# Patient Record
Sex: FEMALE | Race: WHITE | Employment: FULL TIME | URBAN - METROPOLITAN AREA
[De-identification: names, ages, dates, MRNs, and addresses within clinical notes are randomized per-mention and may not be internally consistent; named-entity substitution may affect disease eponyms.]

---

## 2017-01-04 DIAGNOSIS — R51.9 GENERALIZED HEADACHES: ICD-10-CM

## 2017-01-05 RX ORDER — TIZANIDINE HYDROCHLORIDE 2 MG/1
CAPSULE, GELATIN COATED ORAL
Qty: 30 CAPSULE | Refills: 0 | Status: SHIPPED | OUTPATIENT
Start: 2017-01-05 | End: 2017-12-15

## 2017-01-05 RX ORDER — IBUPROFEN 800 MG/1
TABLET ORAL
Qty: 60 TABLET | Refills: 0 | Status: SHIPPED | OUTPATIENT
Start: 2017-01-05 | End: 2017-12-15

## 2017-05-27 ENCOUNTER — HOSPITAL ENCOUNTER (EMERGENCY)
Age: 22
Discharge: HOME | End: 2017-05-27
Attending: EMERGENCY MEDICINE
Payer: COMMERCIAL

## 2017-05-27 VITALS
BODY MASS INDEX: 21.66 KG/M2 | OXYGEN SATURATION: 98 % | SYSTOLIC BLOOD PRESSURE: 110 MMHG | RESPIRATION RATE: 16 BRPM | DIASTOLIC BLOOD PRESSURE: 70 MMHG | TEMPERATURE: 97.7 F | HEART RATE: 86 BPM | HEIGHT: 65 IN | WEIGHT: 130 LBS

## 2017-05-27 DIAGNOSIS — B27.80 OTHER INFECTIOUS MONONUCLEOSIS WITHOUT COMPLICATION: Primary | ICD-10-CM

## 2017-05-27 LAB
-: NORMAL
CHP ED QC CHECK: YES
DIRECT EXAM: NORMAL
HCG, PREGNANCY URINE (POC): NEGATIVE
Lab: NORMAL
MONONUCLEOSIS SCREEN: POSITIVE
PREGNANCY TEST URINE, POC: NEGATIVE
SPECIMEN DESCRIPTION: NORMAL
SPECIMEN DESCRIPTION: NORMAL
STATUS: NORMAL

## 2017-05-27 PROCEDURE — 96372 THER/PROPH/DIAG INJ SC/IM: CPT

## 2017-05-27 PROCEDURE — 36415 COLL VENOUS BLD VENIPUNCTURE: CPT

## 2017-05-27 PROCEDURE — 84703 CHORIONIC GONADOTROPIN ASSAY: CPT

## 2017-05-27 PROCEDURE — 99283 EMERGENCY DEPT VISIT LOW MDM: CPT

## 2017-05-27 PROCEDURE — 86308 HETEROPHILE ANTIBODY SCREEN: CPT

## 2017-05-27 PROCEDURE — 6360000002 HC RX W HCPCS: Performed by: PHYSICIAN ASSISTANT

## 2017-05-27 PROCEDURE — 6370000000 HC RX 637 (ALT 250 FOR IP): Performed by: PHYSICIAN ASSISTANT

## 2017-05-27 PROCEDURE — 87880 STREP A ASSAY W/OPTIC: CPT

## 2017-05-27 RX ORDER — AMOXICILLIN 500 MG/1
500 CAPSULE ORAL 2 TIMES DAILY
COMMUNITY
End: 2017-12-15

## 2017-05-27 RX ORDER — DIPHENHYDRAMINE HCL 25 MG
25 TABLET ORAL EVERY 6 HOURS PRN
Status: DISCONTINUED | OUTPATIENT
Start: 2017-05-27 | End: 2017-05-27 | Stop reason: HOSPADM

## 2017-05-27 RX ORDER — DEXAMETHASONE SODIUM PHOSPHATE 4 MG/ML
10 INJECTION, SOLUTION INTRA-ARTICULAR; INTRALESIONAL; INTRAMUSCULAR; INTRAVENOUS; SOFT TISSUE ONCE
Status: COMPLETED | OUTPATIENT
Start: 2017-05-27 | End: 2017-05-27

## 2017-05-27 RX ADMIN — DIPHENHYDRAMINE HCL 25 MG: 25 TABLET ORAL at 20:56

## 2017-05-27 RX ADMIN — DEXAMETHASONE SODIUM PHOSPHATE 10 MG: 4 INJECTION, SOLUTION INTRAMUSCULAR; INTRAVENOUS at 20:56

## 2017-05-27 ASSESSMENT — ENCOUNTER SYMPTOMS
VOMITING: 0
PERI-ORBITAL EDEMA: 0
ABDOMINAL PAIN: 0
COUGH: 1
WHEEZING: 0
HOARSE VOICE: 0
THROAT SWELLING: 0
SORE THROAT: 1
SHORTNESS OF BREATH: 0
DIARRHEA: 0

## 2017-05-27 ASSESSMENT — PAIN DESCRIPTION - DESCRIPTORS: DESCRIPTORS: ACHING

## 2017-05-27 ASSESSMENT — PAIN DESCRIPTION - LOCATION: LOCATION: CHEST;HEAD

## 2017-05-27 ASSESSMENT — PAIN DESCRIPTION - FREQUENCY: FREQUENCY: CONTINUOUS

## 2017-05-27 ASSESSMENT — PAIN SCALES - GENERAL: PAINLEVEL_OUTOF10: 6

## 2018-10-12 ENCOUNTER — HOSPITAL ENCOUNTER (OUTPATIENT)
Dept: GENERAL RADIOLOGY | Facility: CLINIC | Age: 23
Discharge: HOME | End: 2018-10-14
Payer: COMMERCIAL

## 2018-10-12 ENCOUNTER — HOSPITAL ENCOUNTER (OUTPATIENT)
Facility: CLINIC | Age: 23
Discharge: HOME | End: 2018-10-14
Payer: COMMERCIAL

## 2018-10-12 DIAGNOSIS — M25.531 RIGHT WRIST PAIN: ICD-10-CM

## 2018-10-12 PROCEDURE — 73110 X-RAY EXAM OF WRIST: CPT

## 2019-06-21 ENCOUNTER — HOSPITAL ENCOUNTER (OUTPATIENT)
Age: 24
Discharge: HOME | End: 2019-06-21
Payer: COMMERCIAL

## 2019-06-21 LAB
ALBUMIN SERPL-MCNC: 4.5 G/DL (ref 3.5–5.2)
ALBUMIN/GLOBULIN RATIO: ABNORMAL (ref 1–2.5)
ALP BLD-CCNC: 57 U/L (ref 35–104)
ALT SERPL-CCNC: 30 U/L (ref 5–33)
ANION GAP SERPL CALCULATED.3IONS-SCNC: 10 MMOL/L (ref 9–17)
AST SERPL-CCNC: 25 U/L
BILIRUB SERPL-MCNC: 0.37 MG/DL (ref 0.3–1.2)
BUN BLDV-MCNC: 27 MG/DL (ref 6–20)
BUN/CREAT BLD: ABNORMAL (ref 9–20)
CALCIUM SERPL-MCNC: 9.6 MG/DL (ref 8.6–10.4)
CHLORIDE BLD-SCNC: 101 MMOL/L (ref 98–107)
CHOLESTEROL/HDL RATIO: 2.2
CHOLESTEROL: 183 MG/DL
CO2: 26 MMOL/L (ref 20–31)
CREAT SERPL-MCNC: 0.86 MG/DL (ref 0.5–0.9)
ESTRADIOL LEVEL: 63 PG/ML (ref 27–314)
FOLLICLE STIMULATING HORMONE: 7 U/L (ref 1.7–21.5)
GFR AFRICAN AMERICAN: >60 ML/MIN
GFR NON-AFRICAN AMERICAN: >60 ML/MIN
GFR SERPL CREATININE-BSD FRML MDRD: ABNORMAL ML/MIN/{1.73_M2}
GFR SERPL CREATININE-BSD FRML MDRD: ABNORMAL ML/MIN/{1.73_M2}
GLUCOSE BLD-MCNC: 72 MG/DL (ref 70–99)
HCT VFR BLD CALC: 44.7 % (ref 36–46)
HDLC SERPL-MCNC: 82 MG/DL
HEMOGLOBIN: 14.5 G/DL (ref 12–16)
INSULIN COMMENT: NORMAL
INSULIN REFERENCE RANGE:: NORMAL
INSULIN: 15.8 MU/L
LDL CHOLESTEROL: 90 MG/DL (ref 0–130)
LH: 10.1 U/L (ref 1–95.6)
MCH RBC QN AUTO: 31.4 PG (ref 26–34)
MCHC RBC AUTO-ENTMCNC: 32.4 G/DL (ref 31–37)
MCV RBC AUTO: 96.9 FL (ref 80–100)
NRBC AUTOMATED: ABNORMAL PER 100 WBC
PDW BLD-RTO: 13.4 % (ref 11.5–14.9)
PLATELET # BLD: 190 K/UL (ref 150–450)
PMV BLD AUTO: 8 FL (ref 6–12)
POTASSIUM SERPL-SCNC: 4.3 MMOL/L (ref 3.7–5.3)
PROLACTIN: 2.96 UG/L (ref 4.79–23.3)
RBC # BLD: 4.62 M/UL (ref 4–5.2)
SODIUM BLD-SCNC: 137 MMOL/L (ref 135–144)
TESTOSTERONE TOTAL: 21 NG/DL (ref 20–70)
TOTAL PROTEIN: 7.5 G/DL (ref 6.4–8.3)
TRIGL SERPL-MCNC: 54 MG/DL
VLDLC SERPL CALC-MCNC: NORMAL MG/DL (ref 1–30)
WBC # BLD: 4 K/UL (ref 4.5–13.5)

## 2019-06-21 PROCEDURE — 82670 ASSAY OF TOTAL ESTRADIOL: CPT

## 2019-06-21 PROCEDURE — 83002 ASSAY OF GONADOTROPIN (LH): CPT

## 2019-06-21 PROCEDURE — 84146 ASSAY OF PROLACTIN: CPT

## 2019-06-21 PROCEDURE — 83001 ASSAY OF GONADOTROPIN (FSH): CPT

## 2019-06-21 PROCEDURE — 80053 COMPREHEN METABOLIC PANEL: CPT

## 2019-06-21 PROCEDURE — 84403 ASSAY OF TOTAL TESTOSTERONE: CPT

## 2019-06-21 PROCEDURE — 85027 COMPLETE CBC AUTOMATED: CPT

## 2019-06-21 PROCEDURE — 83525 ASSAY OF INSULIN: CPT

## 2019-06-21 PROCEDURE — 36415 COLL VENOUS BLD VENIPUNCTURE: CPT

## 2019-06-21 PROCEDURE — 80061 LIPID PANEL: CPT

## 2019-08-27 ENCOUNTER — HOSPITAL ENCOUNTER (OUTPATIENT)
Age: 24
Setting detail: SPECIMEN
Discharge: HOME | End: 2019-08-27
Payer: COMMERCIAL

## 2019-08-27 DIAGNOSIS — R14.0 ABDOMINAL BLOATING: ICD-10-CM

## 2019-08-27 DIAGNOSIS — R68.81 EARLY SATIETY: ICD-10-CM

## 2019-08-27 DIAGNOSIS — R19.7 DIARRHEA, UNSPECIFIED TYPE: ICD-10-CM

## 2019-08-27 DIAGNOSIS — R10.9 ABDOMINAL CRAMPING: ICD-10-CM

## 2019-08-27 LAB
ABSOLUTE EOS #: <0.03 K/UL (ref 0–0.44)
ABSOLUTE IMMATURE GRANULOCYTE: <0.03 K/UL (ref 0–0.3)
ABSOLUTE LYMPH #: 1.7 K/UL (ref 1.1–3.7)
ABSOLUTE MONO #: 0.57 K/UL (ref 0.1–1.4)
ALBUMIN SERPL-MCNC: 4.5 G/DL (ref 3.5–5.2)
ALBUMIN/GLOBULIN RATIO: 1.4 (ref 1–2.5)
ALP BLD-CCNC: 52 U/L (ref 35–104)
ALT SERPL-CCNC: 39 U/L (ref 5–33)
AMYLASE: 71 U/L (ref 28–100)
ANION GAP SERPL CALCULATED.3IONS-SCNC: 15 MMOL/L (ref 9–17)
AST SERPL-CCNC: 30 U/L
BASOPHILS # BLD: 1 % (ref 0–2)
BASOPHILS ABSOLUTE: 0.04 K/UL (ref 0–0.2)
BILIRUB SERPL-MCNC: 0.48 MG/DL (ref 0.3–1.2)
BUN BLDV-MCNC: 21 MG/DL (ref 6–20)
BUN/CREAT BLD: ABNORMAL (ref 9–20)
CALCIUM SERPL-MCNC: 10.1 MG/DL (ref 8.6–10.4)
CHLORIDE BLD-SCNC: 102 MMOL/L (ref 98–107)
CO2: 23 MMOL/L (ref 20–31)
CREAT SERPL-MCNC: 0.76 MG/DL (ref 0.5–0.9)
DIFFERENTIAL TYPE: ABNORMAL
EOSINOPHILS RELATIVE PERCENT: 0 % (ref 1–4)
GFR AFRICAN AMERICAN: >60 ML/MIN
GFR NON-AFRICAN AMERICAN: >60 ML/MIN
GFR SERPL CREATININE-BSD FRML MDRD: ABNORMAL ML/MIN/{1.73_M2}
GFR SERPL CREATININE-BSD FRML MDRD: ABNORMAL ML/MIN/{1.73_M2}
GLUCOSE BLD-MCNC: 79 MG/DL (ref 70–99)
HCT VFR BLD CALC: 45.7 % (ref 36.3–47.1)
HEMOGLOBIN: 14.5 G/DL (ref 11.9–15.1)
IMMATURE GRANULOCYTES: 0 %
LIPASE: 32 U/L (ref 13–60)
LYMPHOCYTES # BLD: 22 % (ref 25–45)
MCH RBC QN AUTO: 30.9 PG (ref 25.2–33.5)
MCHC RBC AUTO-ENTMCNC: 31.7 G/DL (ref 28.4–34.8)
MCV RBC AUTO: 97.2 FL (ref 82.6–102.9)
MONOCYTES # BLD: 7 % (ref 2–8)
NRBC AUTOMATED: 0 PER 100 WBC
PDW BLD-RTO: 12.8 % (ref 11.8–14.4)
PLATELET # BLD: 209 K/UL (ref 138–453)
PLATELET ESTIMATE: ABNORMAL
PMV BLD AUTO: 11.9 FL (ref 8.1–13.5)
POTASSIUM SERPL-SCNC: 4.1 MMOL/L (ref 3.7–5.3)
RBC # BLD: 4.7 M/UL (ref 3.95–5.11)
RBC # BLD: ABNORMAL 10*6/UL
SEG NEUTROPHILS: 70 % (ref 34–64)
SEGMENTED NEUTROPHILS ABSOLUTE COUNT: 5.45 K/UL (ref 1.5–8.1)
SODIUM BLD-SCNC: 140 MMOL/L (ref 135–144)
TOTAL PROTEIN: 7.7 G/DL (ref 6.4–8.3)
WBC # BLD: 7.8 K/UL (ref 4.5–13.5)
WBC # BLD: ABNORMAL 10*3/UL

## 2019-08-28 LAB
GLIADIN DEAMINIDATED PEPTIDE AB IGA: 1 U/ML
GLIADIN DEAMINIDATED PEPTIDE AB IGG: <0.4 U/ML
IGA: 181 MG/DL (ref 70–400)
TISSUE TRANSGLUTAMINASE ANTIBODY IGG: <0.6 U/ML
TISSUE TRANSGLUTAMINASE IGA: 0.3 U/ML

## 2019-10-08 ENCOUNTER — HOSPITAL ENCOUNTER (OUTPATIENT)
Age: 24
Setting detail: SPECIMEN
Discharge: HOME | End: 2019-10-08
Payer: COMMERCIAL

## 2019-10-08 LAB
ESTRADIOL LEVEL: 34 PG/ML (ref 27–314)
TESTOSTERONE TOTAL: 350 NG/DL (ref 20–70)

## 2019-10-08 PROCEDURE — 82670 ASSAY OF TOTAL ESTRADIOL: CPT

## 2019-10-08 PROCEDURE — 36415 COLL VENOUS BLD VENIPUNCTURE: CPT

## 2019-10-08 PROCEDURE — 84403 ASSAY OF TOTAL TESTOSTERONE: CPT

## 2019-10-10 ENCOUNTER — HOSPITAL ENCOUNTER (EMERGENCY)
Age: 24
Discharge: HOME | End: 2019-10-10
Attending: EMERGENCY MEDICINE
Payer: COMMERCIAL

## 2019-10-10 VITALS
HEIGHT: 65 IN | HEART RATE: 80 BPM | WEIGHT: 132 LBS | SYSTOLIC BLOOD PRESSURE: 114 MMHG | BODY MASS INDEX: 21.99 KG/M2 | RESPIRATION RATE: 18 BRPM | TEMPERATURE: 98.3 F | DIASTOLIC BLOOD PRESSURE: 73 MMHG | OXYGEN SATURATION: 97 %

## 2019-10-10 DIAGNOSIS — K62.5 RECTAL BLEEDING: Primary | ICD-10-CM

## 2019-10-10 LAB
BILIRUBIN URINE: NEGATIVE
COLOR: YELLOW
COMMENT UA: NORMAL
GLUCOSE URINE: NEGATIVE
HCG QUALITATIVE: NEGATIVE
HCT VFR BLD CALC: 47.7 % (ref 36–46)
HEMOGLOBIN: 15.8 G/DL (ref 12–16)
KETONES, URINE: NEGATIVE
LEUKOCYTE ESTERASE, URINE: NEGATIVE
NITRITE, URINE: NEGATIVE
PH UA: 7 (ref 5–8)
PROTEIN UA: NEGATIVE
SPECIFIC GRAVITY UA: 1.01 (ref 1–1.03)
TURBIDITY: CLEAR
URINE HGB: NEGATIVE
UROBILINOGEN, URINE: NORMAL

## 2019-10-10 PROCEDURE — 84703 CHORIONIC GONADOTROPIN ASSAY: CPT

## 2019-10-10 PROCEDURE — 99283 EMERGENCY DEPT VISIT LOW MDM: CPT

## 2019-10-10 PROCEDURE — 36415 COLL VENOUS BLD VENIPUNCTURE: CPT

## 2019-10-10 PROCEDURE — 85018 HEMOGLOBIN: CPT

## 2019-10-10 PROCEDURE — 85014 HEMATOCRIT: CPT

## 2019-10-10 PROCEDURE — 81003 URINALYSIS AUTO W/O SCOPE: CPT

## 2019-10-10 ASSESSMENT — PAIN SCALES - GENERAL: PAINLEVEL_OUTOF10: 6

## 2019-10-11 ENCOUNTER — TELEPHONE (OUTPATIENT)
Dept: GASTROENTEROLOGY | Age: 24
End: 2019-10-11

## 2019-10-11 ENCOUNTER — HOSPITAL ENCOUNTER (OUTPATIENT)
Age: 24
Setting detail: SPECIMEN
Discharge: HOME | End: 2019-10-11
Payer: COMMERCIAL

## 2019-10-11 DIAGNOSIS — R53.83 FATIGUE, UNSPECIFIED TYPE: ICD-10-CM

## 2019-10-11 LAB
TSH SERPL DL<=0.05 MIU/L-ACNC: 2.66 MIU/L (ref 0.3–5)
VITAMIN D 25-HYDROXY: 32.2 NG/ML (ref 30–100)

## 2019-10-18 ENCOUNTER — HOSPITAL ENCOUNTER (EMERGENCY)
Age: 24
Discharge: HOME | End: 2019-10-18
Attending: EMERGENCY MEDICINE
Payer: COMMERCIAL

## 2019-10-18 VITALS
DIASTOLIC BLOOD PRESSURE: 80 MMHG | SYSTOLIC BLOOD PRESSURE: 98 MMHG | RESPIRATION RATE: 14 BRPM | BODY MASS INDEX: 21.99 KG/M2 | HEIGHT: 65 IN | HEART RATE: 73 BPM | TEMPERATURE: 98.2 F | OXYGEN SATURATION: 98 % | WEIGHT: 132 LBS

## 2019-10-18 DIAGNOSIS — F31.9 BIPOLAR 1 DISORDER (HCC): Primary | ICD-10-CM

## 2019-10-18 PROCEDURE — 99284 EMERGENCY DEPT VISIT MOD MDM: CPT

## 2019-10-18 PROCEDURE — 6370000000 HC RX 637 (ALT 250 FOR IP): Performed by: EMERGENCY MEDICINE

## 2019-10-18 RX ORDER — LORAZEPAM 1 MG/1
1 TABLET ORAL ONCE
Status: COMPLETED | OUTPATIENT
Start: 2019-10-18 | End: 2019-10-18

## 2019-10-18 RX ADMIN — LORAZEPAM 1 MG: 1 TABLET ORAL at 20:18

## 2019-10-24 ENCOUNTER — OFFICE VISIT (OUTPATIENT)
Dept: GASTROENTEROLOGY | Age: 24
End: 2019-10-24
Payer: COMMERCIAL

## 2019-10-24 VITALS
HEART RATE: 103 BPM | BODY MASS INDEX: 22.5 KG/M2 | WEIGHT: 135.2 LBS | SYSTOLIC BLOOD PRESSURE: 121 MMHG | DIASTOLIC BLOOD PRESSURE: 76 MMHG

## 2019-10-24 DIAGNOSIS — K62.5 RECTAL BLEEDING: Primary | ICD-10-CM

## 2019-10-24 DIAGNOSIS — R10.9 ABDOMINAL CRAMPING: ICD-10-CM

## 2019-10-24 PROCEDURE — 99244 OFF/OP CNSLTJ NEW/EST MOD 40: CPT | Performed by: INTERNAL MEDICINE

## 2019-10-24 ASSESSMENT — ENCOUNTER SYMPTOMS
SORE THROAT: 0
NAUSEA: 1
ALLERGIC/IMMUNOLOGIC NEGATIVE: 1
CHOKING: 0
WHEEZING: 0
TROUBLE SWALLOWING: 0
ABDOMINAL PAIN: 1
VOICE CHANGE: 0
BLOOD IN STOOL: 1
RECTAL PAIN: 0
COUGH: 0
RESPIRATORY NEGATIVE: 1
CONSTIPATION: 1
SINUS PRESSURE: 0
BACK PAIN: 0
VOMITING: 0
DIARRHEA: 1
ABDOMINAL DISTENTION: 1
ANAL BLEEDING: 1
EYES NEGATIVE: 1

## 2019-10-25 RX ORDER — POLYETHYLENE GLYCOL 3350 17 G/17G
POWDER, FOR SOLUTION ORAL
Qty: 255 G | Refills: 0 | Status: SHIPPED | OUTPATIENT
Start: 2019-10-25 | End: 2019-11-14 | Stop reason: ALTCHOICE

## 2019-10-28 ENCOUNTER — TELEPHONE (OUTPATIENT)
Dept: GASTROENTEROLOGY | Age: 24
End: 2019-10-28

## 2019-11-04 ENCOUNTER — ANESTHESIA EVENT (OUTPATIENT)
Dept: ENDOSCOPY | Age: 24
End: 2019-11-04
Payer: COMMERCIAL

## 2019-11-04 ENCOUNTER — ANESTHESIA (OUTPATIENT)
Dept: ENDOSCOPY | Age: 24
End: 2019-11-04
Payer: COMMERCIAL

## 2019-11-04 ENCOUNTER — HOSPITAL ENCOUNTER (OUTPATIENT)
Age: 24
Setting detail: OUTPATIENT SURGERY
Discharge: HOME | End: 2019-11-04
Attending: INTERNAL MEDICINE | Admitting: INTERNAL MEDICINE
Payer: COMMERCIAL

## 2019-11-04 VITALS — SYSTOLIC BLOOD PRESSURE: 85 MMHG | DIASTOLIC BLOOD PRESSURE: 48 MMHG | OXYGEN SATURATION: 98 %

## 2019-11-04 VITALS
RESPIRATION RATE: 11 BRPM | OXYGEN SATURATION: 96 % | SYSTOLIC BLOOD PRESSURE: 91 MMHG | DIASTOLIC BLOOD PRESSURE: 66 MMHG | BODY MASS INDEX: 22.49 KG/M2 | TEMPERATURE: 97.5 F | HEART RATE: 69 BPM | HEIGHT: 65 IN | WEIGHT: 135 LBS

## 2019-11-04 LAB
-: NORMAL
HCG, PREGNANCY URINE (POC): NEGATIVE

## 2019-11-04 PROCEDURE — 45380 COLONOSCOPY AND BIOPSY: CPT | Performed by: INTERNAL MEDICINE

## 2019-11-04 PROCEDURE — 88305 TISSUE EXAM BY PATHOLOGIST: CPT

## 2019-11-04 PROCEDURE — 2580000003 HC RX 258: Performed by: INTERNAL MEDICINE

## 2019-11-04 PROCEDURE — 7100000001 HC PACU RECOVERY - ADDTL 15 MIN: Performed by: INTERNAL MEDICINE

## 2019-11-04 PROCEDURE — 81025 URINE PREGNANCY TEST: CPT

## 2019-11-04 PROCEDURE — 2500000003 HC RX 250 WO HCPCS: Performed by: NURSE ANESTHETIST, CERTIFIED REGISTERED

## 2019-11-04 PROCEDURE — 3609010300 HC COLONOSCOPY W/BIOPSY SINGLE/MULTIPLE: Performed by: INTERNAL MEDICINE

## 2019-11-04 PROCEDURE — 6360000002 HC RX W HCPCS: Performed by: NURSE ANESTHETIST, CERTIFIED REGISTERED

## 2019-11-04 PROCEDURE — 2709999900 HC NON-CHARGEABLE SUPPLY: Performed by: INTERNAL MEDICINE

## 2019-11-04 PROCEDURE — 3700000000 HC ANESTHESIA ATTENDED CARE: Performed by: INTERNAL MEDICINE

## 2019-11-04 PROCEDURE — 6360000002 HC RX W HCPCS: Performed by: ANESTHESIOLOGY

## 2019-11-04 PROCEDURE — 3700000001 HC ADD 15 MINUTES (ANESTHESIA): Performed by: INTERNAL MEDICINE

## 2019-11-04 PROCEDURE — 6370000000 HC RX 637 (ALT 250 FOR IP): Performed by: ANESTHESIOLOGY

## 2019-11-04 PROCEDURE — 7100000000 HC PACU RECOVERY - FIRST 15 MIN: Performed by: INTERNAL MEDICINE

## 2019-11-04 RX ORDER — DIPHENHYDRAMINE HYDROCHLORIDE 50 MG/ML
12.5 INJECTION INTRAMUSCULAR; INTRAVENOUS
Status: DISCONTINUED | OUTPATIENT
Start: 2019-11-04 | End: 2019-11-04 | Stop reason: HOSPADM

## 2019-11-04 RX ORDER — LABETALOL 20 MG/4 ML (5 MG/ML) INTRAVENOUS SYRINGE
5 EVERY 10 MIN PRN
Status: DISCONTINUED | OUTPATIENT
Start: 2019-11-04 | End: 2019-11-04 | Stop reason: HOSPADM

## 2019-11-04 RX ORDER — PROMETHAZINE HYDROCHLORIDE 25 MG/ML
6.25 INJECTION, SOLUTION INTRAMUSCULAR; INTRAVENOUS
Status: DISCONTINUED | OUTPATIENT
Start: 2019-11-04 | End: 2019-11-04 | Stop reason: SDUPTHER

## 2019-11-04 RX ORDER — PROPOFOL 10 MG/ML
INJECTION, EMULSION INTRAVENOUS PRN
Status: DISCONTINUED | OUTPATIENT
Start: 2019-11-04 | End: 2019-11-04 | Stop reason: SDUPTHER

## 2019-11-04 RX ORDER — HYDRALAZINE HYDROCHLORIDE 20 MG/ML
5 INJECTION INTRAMUSCULAR; INTRAVENOUS EVERY 10 MIN PRN
Status: DISCONTINUED | OUTPATIENT
Start: 2019-11-04 | End: 2019-11-04 | Stop reason: HOSPADM

## 2019-11-04 RX ORDER — SODIUM CHLORIDE, SODIUM LACTATE, POTASSIUM CHLORIDE, CALCIUM CHLORIDE 600; 310; 30; 20 MG/100ML; MG/100ML; MG/100ML; MG/100ML
INJECTION, SOLUTION INTRAVENOUS CONTINUOUS
Status: DISCONTINUED | OUTPATIENT
Start: 2019-11-04 | End: 2019-11-04 | Stop reason: HOSPADM

## 2019-11-04 RX ORDER — MIDAZOLAM HYDROCHLORIDE 1 MG/ML
INJECTION INTRAMUSCULAR; INTRAVENOUS PRN
Status: DISCONTINUED | OUTPATIENT
Start: 2019-11-04 | End: 2019-11-04 | Stop reason: SDUPTHER

## 2019-11-04 RX ORDER — LIDOCAINE HYDROCHLORIDE 10 MG/ML
INJECTION, SOLUTION EPIDURAL; INFILTRATION; INTRACAUDAL; PERINEURAL PRN
Status: DISCONTINUED | OUTPATIENT
Start: 2019-11-04 | End: 2019-11-04 | Stop reason: SDUPTHER

## 2019-11-04 RX ORDER — ONDANSETRON 2 MG/ML
4 INJECTION INTRAMUSCULAR; INTRAVENOUS ONCE
Status: COMPLETED | OUTPATIENT
Start: 2019-11-04 | End: 2019-11-04

## 2019-11-04 RX ORDER — HYDROXYZINE HYDROCHLORIDE 25 MG/1
25 TABLET, FILM COATED ORAL DAILY
COMMUNITY
End: 2020-10-12

## 2019-11-04 RX ORDER — ONDANSETRON 2 MG/ML
4 INJECTION INTRAMUSCULAR; INTRAVENOUS
Status: DISCONTINUED | OUTPATIENT
Start: 2019-11-04 | End: 2019-11-04 | Stop reason: HOSPADM

## 2019-11-04 RX ORDER — 0.9 % SODIUM CHLORIDE 0.9 %
500 INTRAVENOUS SOLUTION INTRAVENOUS
Status: DISCONTINUED | OUTPATIENT
Start: 2019-11-04 | End: 2019-11-04 | Stop reason: HOSPADM

## 2019-11-04 RX ORDER — SCOLOPAMINE TRANSDERMAL SYSTEM 1 MG/1
1 PATCH, EXTENDED RELEASE TRANSDERMAL
Status: DISCONTINUED | OUTPATIENT
Start: 2019-11-04 | End: 2019-11-04 | Stop reason: HOSPADM

## 2019-11-04 RX ADMIN — SODIUM CHLORIDE, POTASSIUM CHLORIDE, SODIUM LACTATE AND CALCIUM CHLORIDE: 600; 310; 30; 20 INJECTION, SOLUTION INTRAVENOUS at 06:56

## 2019-11-04 RX ADMIN — MIDAZOLAM 2 MG: 1 INJECTION INTRAMUSCULAR; INTRAVENOUS at 07:41

## 2019-11-04 RX ADMIN — ONDANSETRON 4 MG: 2 INJECTION INTRAMUSCULAR; INTRAVENOUS at 07:13

## 2019-11-04 RX ADMIN — PROPOFOL 100 MG: 10 INJECTION, EMULSION INTRAVENOUS at 07:52

## 2019-11-04 RX ADMIN — PROPOFOL 100 MG: 10 INJECTION, EMULSION INTRAVENOUS at 08:01

## 2019-11-04 RX ADMIN — PROPOFOL 100 MG: 10 INJECTION, EMULSION INTRAVENOUS at 07:41

## 2019-11-04 RX ADMIN — LIDOCAINE HYDROCHLORIDE 50 MG: 10 INJECTION, SOLUTION EPIDURAL; INFILTRATION; INTRACAUDAL at 07:41

## 2019-11-04 ASSESSMENT — PULMONARY FUNCTION TESTS
PIF_VALUE: 0
PIF_VALUE: 1
PIF_VALUE: 0
PIF_VALUE: 0
PIF_VALUE: 1
PIF_VALUE: 0
PIF_VALUE: 0
PIF_VALUE: 1
PIF_VALUE: 0

## 2019-11-04 ASSESSMENT — PAIN - FUNCTIONAL ASSESSMENT: PAIN_FUNCTIONAL_ASSESSMENT: 0-10

## 2019-11-05 LAB — SURGICAL PATHOLOGY REPORT: NORMAL

## 2019-11-14 ENCOUNTER — OFFICE VISIT (OUTPATIENT)
Dept: GASTROENTEROLOGY | Age: 24
End: 2019-11-14
Payer: COMMERCIAL

## 2019-11-14 VITALS
DIASTOLIC BLOOD PRESSURE: 72 MMHG | HEART RATE: 80 BPM | WEIGHT: 139.4 LBS | BODY MASS INDEX: 23.2 KG/M2 | SYSTOLIC BLOOD PRESSURE: 116 MMHG

## 2019-11-14 DIAGNOSIS — K58.9 IRRITABLE BOWEL SYNDROME, UNSPECIFIED TYPE: ICD-10-CM

## 2019-11-14 DIAGNOSIS — K62.5 RECTAL BLEEDING: Primary | ICD-10-CM

## 2019-11-14 PROCEDURE — 99213 OFFICE O/P EST LOW 20 MIN: CPT | Performed by: INTERNAL MEDICINE

## 2019-11-14 ASSESSMENT — ENCOUNTER SYMPTOMS
CHOKING: 0
RESPIRATORY NEGATIVE: 1
COUGH: 0
SORE THROAT: 0
CONSTIPATION: 1
DIARRHEA: 1
RECTAL PAIN: 0
TROUBLE SWALLOWING: 0
NAUSEA: 1
VOMITING: 1
SINUS PRESSURE: 0
VOICE CHANGE: 0
ABDOMINAL PAIN: 1
BACK PAIN: 1
WHEEZING: 0
BLOOD IN STOOL: 0
EYES NEGATIVE: 1
ALLERGIC/IMMUNOLOGIC NEGATIVE: 1
ABDOMINAL DISTENTION: 1
ANAL BLEEDING: 0

## 2020-01-21 ENCOUNTER — HOSPITAL ENCOUNTER (OUTPATIENT)
Dept: PHYSICAL THERAPY | Age: 25
Setting detail: THERAPIES SERIES
Discharge: HOME | End: 2020-01-21
Payer: OTHER MISCELLANEOUS

## 2020-01-21 PROCEDURE — 97161 PT EVAL LOW COMPLEX 20 MIN: CPT

## 2020-01-21 ASSESSMENT — PAIN DESCRIPTION - PROGRESSION: CLINICAL_PROGRESSION: NOT CHANGED

## 2020-01-21 ASSESSMENT — PAIN DESCRIPTION - ORIENTATION: ORIENTATION: LEFT;INNER

## 2020-01-21 ASSESSMENT — PAIN DESCRIPTION - LOCATION: LOCATION: KNEE

## 2020-01-21 ASSESSMENT — PAIN DESCRIPTION - DESCRIPTORS: DESCRIPTORS: ACHING;DULL

## 2020-01-21 ASSESSMENT — PAIN SCALES - GENERAL: PAINLEVEL_OUTOF10: 5

## 2020-01-21 ASSESSMENT — PAIN DESCRIPTION - ONSET: ONSET: SUDDEN

## 2020-01-21 ASSESSMENT — PAIN DESCRIPTION - FREQUENCY: FREQUENCY: CONTINUOUS

## 2020-01-21 ASSESSMENT — PAIN - FUNCTIONAL ASSESSMENT: PAIN_FUNCTIONAL_ASSESSMENT: PREVENTS OR INTERFERES WITH ALL ACTIVE AND SOME PASSIVE ACTIVITIES

## 2020-01-21 ASSESSMENT — PAIN DESCRIPTION - PAIN TYPE: TYPE: ACUTE PAIN

## 2020-01-22 ENCOUNTER — HOSPITAL ENCOUNTER (OUTPATIENT)
Dept: PHYSICAL THERAPY | Age: 25
Setting detail: THERAPIES SERIES
Discharge: HOME | End: 2020-01-22
Payer: OTHER MISCELLANEOUS

## 2020-01-22 PROCEDURE — 97110 THERAPEUTIC EXERCISES: CPT

## 2020-01-22 ASSESSMENT — PAIN DESCRIPTION - LOCATION: LOCATION: KNEE

## 2020-01-22 ASSESSMENT — PAIN DESCRIPTION - ORIENTATION: ORIENTATION: LEFT;INNER

## 2020-01-22 ASSESSMENT — PAIN DESCRIPTION - DESCRIPTORS: DESCRIPTORS: ACHING;DULL

## 2020-01-22 ASSESSMENT — PAIN DESCRIPTION - PROGRESSION: CLINICAL_PROGRESSION: NOT CHANGED

## 2020-01-22 ASSESSMENT — PAIN SCALES - GENERAL: PAINLEVEL_OUTOF10: 4

## 2020-01-22 ASSESSMENT — PAIN DESCRIPTION - FREQUENCY: FREQUENCY: CONTINUOUS

## 2020-01-22 ASSESSMENT — PAIN DESCRIPTION - PAIN TYPE: TYPE: ACUTE PAIN

## 2020-01-28 ENCOUNTER — HOSPITAL ENCOUNTER (OUTPATIENT)
Dept: PHYSICAL THERAPY | Age: 25
Setting detail: THERAPIES SERIES
Discharge: HOME | End: 2020-01-28
Payer: OTHER MISCELLANEOUS

## 2020-01-28 PROCEDURE — 97035 APP MDLTY 1+ULTRASOUND EA 15: CPT

## 2020-01-28 PROCEDURE — 97110 THERAPEUTIC EXERCISES: CPT

## 2020-01-28 ASSESSMENT — PAIN DESCRIPTION - LOCATION: LOCATION: KNEE

## 2020-01-28 ASSESSMENT — PAIN DESCRIPTION - ONSET: ONSET: SUDDEN

## 2020-01-28 ASSESSMENT — PAIN DESCRIPTION - ORIENTATION: ORIENTATION: LEFT;INNER

## 2020-01-28 ASSESSMENT — PAIN DESCRIPTION - PAIN TYPE: TYPE: ACUTE PAIN

## 2020-01-28 ASSESSMENT — PAIN DESCRIPTION - FREQUENCY: FREQUENCY: CONTINUOUS

## 2020-01-28 ASSESSMENT — PAIN DESCRIPTION - DESCRIPTORS: DESCRIPTORS: ACHING;DULL

## 2020-01-28 ASSESSMENT — PAIN SCALES - GENERAL: PAINLEVEL_OUTOF10: 2

## 2020-01-28 ASSESSMENT — PAIN DESCRIPTION - PROGRESSION: CLINICAL_PROGRESSION: GRADUALLY IMPROVING

## 2020-03-05 ENCOUNTER — HOSPITAL ENCOUNTER (OUTPATIENT)
Age: 25
Setting detail: SPECIMEN
Discharge: HOME | End: 2020-03-05
Payer: COMMERCIAL

## 2020-03-05 LAB
ESTRADIOL LEVEL: 30 PG/ML (ref 27–314)
SEX HORMONE BINDING GLOBULIN: 47 NMOL/L (ref 30–135)
TESTOSTERONE FREE-NONMALE: 133.3 PG/ML (ref 0.8–7.4)
TESTOSTERONE TOTAL: 741 NG/DL (ref 20–70)

## 2020-07-09 ENCOUNTER — HOSPITAL ENCOUNTER (OUTPATIENT)
Age: 25
Setting detail: SPECIMEN
Discharge: HOME | End: 2020-07-09
Payer: COMMERCIAL

## 2020-07-09 LAB
ALBUMIN SERPL-MCNC: 4.4 G/DL (ref 3.5–5.2)
ALBUMIN/GLOBULIN RATIO: 1.6 (ref 1–2.5)
ALP BLD-CCNC: 58 U/L (ref 35–104)
ALT SERPL-CCNC: 15 U/L (ref 5–33)
ANION GAP SERPL CALCULATED.3IONS-SCNC: 15 MMOL/L (ref 9–17)
AST SERPL-CCNC: 20 U/L
BILIRUB SERPL-MCNC: 0.43 MG/DL (ref 0.3–1.2)
BUN BLDV-MCNC: 18 MG/DL (ref 6–20)
BUN/CREAT BLD: NORMAL (ref 9–20)
CALCIUM SERPL-MCNC: 9.5 MG/DL (ref 8.6–10.4)
CHLORIDE BLD-SCNC: 106 MMOL/L (ref 98–107)
CHOLESTEROL/HDL RATIO: 2.7
CHOLESTEROL: 202 MG/DL
CO2: 22 MMOL/L (ref 20–31)
CREAT SERPL-MCNC: 0.88 MG/DL (ref 0.5–0.9)
ESTRADIOL LEVEL: 46 PG/ML (ref 27–314)
FOLLICLE STIMULATING HORMONE: 7.5 U/L (ref 1.7–21.5)
GFR AFRICAN AMERICAN: >60 ML/MIN
GFR NON-AFRICAN AMERICAN: >60 ML/MIN
GFR SERPL CREATININE-BSD FRML MDRD: NORMAL ML/MIN/{1.73_M2}
GFR SERPL CREATININE-BSD FRML MDRD: NORMAL ML/MIN/{1.73_M2}
GLUCOSE BLD-MCNC: 83 MG/DL (ref 70–99)
HCT VFR BLD CALC: 50.3 % (ref 36.3–47.1)
HDLC SERPL-MCNC: 76 MG/DL
HEMOGLOBIN: 16.3 G/DL (ref 11.9–15.1)
INSULIN COMMENT: NORMAL
INSULIN REFERENCE RANGE:: NORMAL
INSULIN: 9.1 MU/L
LDL CHOLESTEROL: 114 MG/DL (ref 0–130)
LH: 4.6 U/L (ref 1–95.6)
MCH RBC QN AUTO: 31.6 PG (ref 25.2–33.5)
MCHC RBC AUTO-ENTMCNC: 32.4 G/DL (ref 28.4–34.8)
MCV RBC AUTO: 97.5 FL (ref 82.6–102.9)
NRBC AUTOMATED: 0 PER 100 WBC
PDW BLD-RTO: 13 % (ref 11.8–14.4)
PLATELET # BLD: 198 K/UL (ref 138–453)
PMV BLD AUTO: 11.8 FL (ref 8.1–13.5)
POTASSIUM SERPL-SCNC: 4.6 MMOL/L (ref 3.7–5.3)
PROLACTIN: 8.59 UG/L (ref 4.79–23.3)
RBC # BLD: 5.16 M/UL (ref 3.95–5.11)
SODIUM BLD-SCNC: 143 MMOL/L (ref 135–144)
TESTOSTERONE TOTAL: 1118 NG/DL (ref 20–70)
TOTAL PROTEIN: 7.2 G/DL (ref 6.4–8.3)
TRIGL SERPL-MCNC: 58 MG/DL
VLDLC SERPL CALC-MCNC: ABNORMAL MG/DL (ref 1–30)
WBC # BLD: 5.2 K/UL (ref 3.5–11.3)

## 2020-10-12 ENCOUNTER — HOSPITAL ENCOUNTER (INPATIENT)
Age: 25
LOS: 3 days | Discharge: HOME | DRG: 885 | End: 2020-10-15
Attending: EMERGENCY MEDICINE | Admitting: PSYCHIATRY & NEUROLOGY
Payer: MEDICAID

## 2020-10-12 PROBLEM — F32.2 SEVERE MAJOR DEPRESSION (HCC): Status: ACTIVE | Noted: 2020-10-12

## 2020-10-12 PROBLEM — F32.9 MAJOR DEPRESSION, CHRONIC: Status: ACTIVE | Noted: 2020-10-12

## 2020-10-12 LAB
ABSOLUTE EOS #: 0 K/UL (ref 0–0.4)
ABSOLUTE IMMATURE GRANULOCYTE: ABNORMAL K/UL (ref 0–0.3)
ABSOLUTE LYMPH #: 1.3 K/UL (ref 1–4.8)
ABSOLUTE MONO #: 0.4 K/UL (ref 0.1–1.3)
ALBUMIN SERPL-MCNC: 4.6 G/DL (ref 3.5–5.2)
ALBUMIN/GLOBULIN RATIO: ABNORMAL (ref 1–2.5)
ALP BLD-CCNC: 48 U/L (ref 35–104)
ALT SERPL-CCNC: 24 U/L (ref 5–33)
ANION GAP SERPL CALCULATED.3IONS-SCNC: 12 MMOL/L (ref 9–17)
AST SERPL-CCNC: 17 U/L
BASOPHILS # BLD: 1 % (ref 0–2)
BASOPHILS ABSOLUTE: 0 K/UL (ref 0–0.2)
BILIRUB SERPL-MCNC: 0.41 MG/DL (ref 0.3–1.2)
BUN BLDV-MCNC: 16 MG/DL (ref 6–20)
BUN/CREAT BLD: ABNORMAL (ref 9–20)
CALCIUM SERPL-MCNC: 9.5 MG/DL (ref 8.6–10.4)
CHLORIDE BLD-SCNC: 102 MMOL/L (ref 98–107)
CO2: 23 MMOL/L (ref 20–31)
CREAT SERPL-MCNC: 0.82 MG/DL (ref 0.5–0.9)
DIFFERENTIAL TYPE: ABNORMAL
EOSINOPHILS RELATIVE PERCENT: 0 % (ref 0–4)
GFR AFRICAN AMERICAN: >60 ML/MIN
GFR NON-AFRICAN AMERICAN: >60 ML/MIN
GFR SERPL CREATININE-BSD FRML MDRD: ABNORMAL ML/MIN/{1.73_M2}
GFR SERPL CREATININE-BSD FRML MDRD: ABNORMAL ML/MIN/{1.73_M2}
GLUCOSE BLD-MCNC: 121 MG/DL (ref 70–99)
HCG QUALITATIVE: NEGATIVE
HCT VFR BLD CALC: 41.6 % (ref 36–46)
HEMOGLOBIN: 14.3 G/DL (ref 12–16)
IMMATURE GRANULOCYTES: ABNORMAL %
LYMPHOCYTES # BLD: 18 % (ref 24–44)
MCH RBC QN AUTO: 32.1 PG (ref 26–34)
MCHC RBC AUTO-ENTMCNC: 34.5 G/DL (ref 31–37)
MCV RBC AUTO: 93 FL (ref 80–100)
MONOCYTES # BLD: 6 % (ref 1–7)
NRBC AUTOMATED: ABNORMAL PER 100 WBC
PDW BLD-RTO: 13.3 % (ref 11.5–14.9)
PLATELET # BLD: 173 K/UL (ref 150–450)
PLATELET ESTIMATE: ABNORMAL
PMV BLD AUTO: 8.9 FL (ref 6–12)
POTASSIUM SERPL-SCNC: 3.6 MMOL/L (ref 3.7–5.3)
RBC # BLD: 4.48 M/UL (ref 4–5.2)
RBC # BLD: ABNORMAL 10*6/UL
SEG NEUTROPHILS: 75 % (ref 36–66)
SEGMENTED NEUTROPHILS ABSOLUTE COUNT: 5.4 K/UL (ref 1.3–9.1)
SODIUM BLD-SCNC: 137 MMOL/L (ref 135–144)
TOTAL PROTEIN: 7.6 G/DL (ref 6.4–8.3)
TSH SERPL DL<=0.05 MIU/L-ACNC: 2 MIU/L (ref 0.3–5)
WBC # BLD: 7.2 K/UL (ref 3.5–11)
WBC # BLD: ABNORMAL 10*3/UL

## 2020-10-12 PROCEDURE — 1240000000 HC EMOTIONAL WELLNESS R&B

## 2020-10-12 PROCEDURE — 84443 ASSAY THYROID STIM HORMONE: CPT

## 2020-10-12 PROCEDURE — 99284 EMERGENCY DEPT VISIT MOD MDM: CPT

## 2020-10-12 PROCEDURE — 80053 COMPREHEN METABOLIC PANEL: CPT

## 2020-10-12 PROCEDURE — 85025 COMPLETE CBC W/AUTO DIFF WBC: CPT

## 2020-10-12 PROCEDURE — 36415 COLL VENOUS BLD VENIPUNCTURE: CPT

## 2020-10-12 PROCEDURE — 84703 CHORIONIC GONADOTROPIN ASSAY: CPT

## 2020-10-12 RX ORDER — LAMOTRIGINE 25 MG/1
25 TABLET ORAL DAILY
Status: ON HOLD | COMMUNITY
End: 2020-10-15 | Stop reason: HOSPADM

## 2020-10-12 RX ORDER — MAGNESIUM HYDROXIDE/ALUMINUM HYDROXICE/SIMETHICONE 120; 1200; 1200 MG/30ML; MG/30ML; MG/30ML
30 SUSPENSION ORAL EVERY 6 HOURS PRN
Status: DISCONTINUED | OUTPATIENT
Start: 2020-10-12 | End: 2020-10-15 | Stop reason: HOSPADM

## 2020-10-12 RX ORDER — CALCIUM POLYCARBOPHIL 625 MG 625 MG/1
625 TABLET ORAL DAILY
Status: ON HOLD | COMMUNITY
End: 2020-10-13

## 2020-10-12 RX ORDER — POLYETHYLENE GLYCOL 3350 17 G/17G
17 POWDER, FOR SOLUTION ORAL DAILY PRN
Status: DISCONTINUED | OUTPATIENT
Start: 2020-10-12 | End: 2020-10-15 | Stop reason: HOSPADM

## 2020-10-12 RX ORDER — ACETAMINOPHEN 325 MG/1
650 TABLET ORAL EVERY 4 HOURS PRN
Status: DISCONTINUED | OUTPATIENT
Start: 2020-10-12 | End: 2020-10-15 | Stop reason: HOSPADM

## 2020-10-12 RX ORDER — IBUPROFEN 200 MG
400 TABLET ORAL EVERY 6 HOURS PRN
Status: ON HOLD | COMMUNITY
End: 2020-10-13

## 2020-10-12 RX ORDER — HYDROXYZINE 50 MG/1
50 TABLET, FILM COATED ORAL 3 TIMES DAILY PRN
Status: DISCONTINUED | OUTPATIENT
Start: 2020-10-12 | End: 2020-10-15 | Stop reason: HOSPADM

## 2020-10-12 RX ORDER — TRAZODONE HYDROCHLORIDE 50 MG/1
50 TABLET ORAL NIGHTLY PRN
Status: DISCONTINUED | OUTPATIENT
Start: 2020-10-13 | End: 2020-10-15 | Stop reason: HOSPADM

## 2020-10-12 RX ORDER — IBUPROFEN 400 MG/1
400 TABLET ORAL EVERY 6 HOURS PRN
Status: DISCONTINUED | OUTPATIENT
Start: 2020-10-12 | End: 2020-10-15 | Stop reason: HOSPADM

## 2020-10-12 RX ORDER — LAMOTRIGINE 100 MG/1
100 TABLET ORAL DAILY
Status: ON HOLD | COMMUNITY
End: 2020-10-13

## 2020-10-12 ASSESSMENT — SLEEP AND FATIGUE QUESTIONNAIRES
DO YOU USE A SLEEP AID: NO
AVERAGE NUMBER OF SLEEP HOURS: 6
DO YOU HAVE DIFFICULTY SLEEPING: NO

## 2020-10-12 ASSESSMENT — LIFESTYLE VARIABLES: HISTORY_ALCOHOL_USE: NO

## 2020-10-12 ASSESSMENT — PATIENT HEALTH QUESTIONNAIRE - PHQ9: SUM OF ALL RESPONSES TO PHQ QUESTIONS 1-9: 12

## 2020-10-12 NOTE — ED PROVIDER NOTES
[address]      Pt Name: Kiera Hoffman  MRN: [id number]  [place] 1995  Date of evaluation: 10/12/20      CHIEF COMPLAINT       Chief Complaint   Patient presents with    Mental Health Problem     HISTORY OF PRESENT ILLNESS   HPI 23 y.o. female presents with c/o suicidal thoughts. The patient was brought to the emergency department by girlfriend. The patient reports feeling depressed and having thought of suicide for the last month. The patient denies a h/o of previous suicide attempt. The patient reports that their symptoms were provoked by family difficulties, gender identity issues / insurance issues, inability to follow as an outpatient with behavioral health resources ([facility] but wasn't able to because of insurance issues). The patient denies drug use, denies attempts at self harm to this point. The patient no medical complaints at this time. REVIEW OF SYSTEMS     Review of Systems   Constitutional: Negative for fever. HENT: Negative for congestion. Eyes: Negative for visual disturbance. Respiratory: Negative for cough. Cardiovascular: Negative for chest pain. Gastrointestinal: Negative for abdominal pain. Skin: Negative for rash. Neurological: Negative for headaches. Psychiatric/Behavioral: Positive for suicidal ideas.      PAST MEDICAL HISTORY     Past Medical History:   Diagnosis Date    Bipolar disorder without psychotic features ([address]     Depression     Headache        SURGICAL HISTORY       Past Surgical History:   Procedure Laterality Date    COLONOSCOPY N/A 11/4/2019    COLONOSCOPY WITH BIOPSY performed by Topher Merrill MD at [address] [place] [address]       Current Discharge Medication List      CONTINUE these medications which have NOT CHANGED    Details   lamoTRIgine (LAMICTAL) 25 MG tablet Take 25 mg by mouth daily Indications: total dose 125 mg daily      testosterone cypionate (Jessica Marky CYPIONATE) 200 MG/ML injection Inject 60 mg into the muscle. 0.3 mL = 60 mg  Refills: 0      ARIPiprazole (ABILIFY) 15 MG tablet Take 15 mg by mouth daily   Refills: 0             ALLERGIES     is allergic to pcn [penicillins]; beef-derived products; and pork-derived products. FAMILY HISTORY     She indicated that her mother is alive. She indicated that her father is alive. She indicated that her maternal grandmother is alive. She indicated that her maternal grandfather is alive. She indicated that her paternal grandmother is alive. She indicated that her paternal grandfather is alive. SOCIAL HISTORY      reports that she has never smoked. She has never used smokeless tobacco. She reports that she does not drink alcohol or use drugs. PHYSICAL EXAM     INITIAL VITALS: /76   Pulse 98   Temp 98 °F (36.7 °C) (Oral)   Resp 14   Ht 5' 5\" (1.651 m)   Wt 138 lb (62.6 kg)   LMP 10/04/2020   SpO2 100%   BMI 22.96 kg/m²   Gen: NAD  Head: Normocephalic, atraumatic  Eye: Pupils equal round reactive to light, no conjunctivitis  Heart: Regular rate and rhythm no murmurs  Lungs: Clear to auscultation bilaterally, no respiratory distress  Chest wall: No crepitus, no tenderness palpation  Abdomen: Soft, nontender, nondistended, with no peritoneal signs  Skin: No diaphoresis. no lacerations. Neurologic: Patient is alert and oriented x3,speech is fluent speech  Extremities:no edema    MEDICAL DECISION MAKING:     MDM  23 y.o. female with depression, suicidal thoughts,  suicidal plan. The patient does not appear intoxicated. The patient is showing no signs of any acute active toxidrome. The patient denies any overdose attempt or  medical complaints at this time. We'll screen for any acetaminophen or salicylate ingestion, we'll check baseline renal function, liver function, a drug screen, cbc and reassess. Hospital Course:     Laboratory studies reviewed and unremarkable.   Patient is medically clear TO:  No follow-up provider specified.     DISCHARGE MEDICATIONS:  Current Discharge Medication List            Kayli Chowdary MD  Attending Emergency Physician                      Kayli Chowdary MD  10/14/20 8727

## 2020-10-12 NOTE — ED NOTES
Pt arrives to ED c/o suicidal thoughts. Patient states that he recently had a fall out with his family and was cut off from his insurance plan and has been unable to get into her therapist appointments. Patient states that he came here at the recommendation of his girlfriend who wanted him to seek treatment. Patient states that his thoughts have been \"boredline suicidal thoughts. \" Patient denies any pain. Patient is resting in recliner at this time with no s/s of distress.       Neisha Guzman RN  10/12/20 0922

## 2020-10-12 NOTE — ED NOTES
Report given to Adeel Aguero RN from ED. Report method in person   The following was reviewed with receiving RN:   Current vital signs:  BP (!) 146/61   Pulse 94   Temp 97.7 °F (36.5 °C)   Resp 14   Ht 5' 5\" (1.651 m)   Wt 138 lb (62.6 kg)   LMP 10/04/2020   SpO2 99%   BMI 22.96 kg/m²                MEWS Score: 0     Any medication or safety alerts were reviewed. Any pending diagnostics and notifications were also reviewed, as well as any safety concerns or issues, abnormal labs, abnormal imaging, and abnormal assessment findings. Questions were answered.             Karime West RN  10/12/20 7565

## 2020-10-12 NOTE — ED NOTES
Provisional Diagnosis:   Patient reports a history of bipolar     Psychosocial and Contextual Factors:     Patient identifies as transgender. Patient has relationship issues. C-SSRS Summary:      Patient: X  Family:   Agency:         Present Suicidal Behavior:  X    Verbal:  Patient reports suicidal ideations. Attempt: Patient denies. Past Suicidal Behavior: X    Verbal: Patient reports he has experienced suicidal ideations in the past.    Attempt: Patient denies. Substance Abuse: Patient denies. Self-Injurious/Self-Mutilation: Patient denies. Trauma Identified: Patient reports trauma related to trans gender status. Protective Factors:    Patient has housing. Patient is linked with mental health agency. Patient has employment. Risk Factors:    Patient does not have insurance. Patient is not medication compliant. Patient has limited support system. Clinical Summary:    Patient is a 23year old transgender person that identifies as a male. Patient prefers to be called \"Gama. \"     Patient was brought to the ED today via his girlfriend for mental health problems. Patient reports he has a history of bipolar disorder. Patient reports he has been difficulty managing his mental health symptoms for the past month. Patient reports he reached out to [facility] for support but was not able to access help as his insurance was recently ended. Patient reports feeling depressed, hopeless and experiencing suicidal ideations. Patient states \"I know I can't keep myself safe. \" Patient denies H/I at this time. Patient denies auditory or visual hallucinations. Patient reports he has been treated for bipolar at [facility] but has not recently been compliant with treatment because of insurance. Patient reports he has had ongoing conflicts with this parents related to being transgender, having an  girlfriend and more recently protesting.  Patient reports his parents have no cut him

## 2020-10-13 PROBLEM — F31.9 BIPOLAR DISORDER (HCC): Status: ACTIVE | Noted: 2020-10-13

## 2020-10-13 LAB
SARS-COV-2, RAPID: NORMAL
SARS-COV-2: NORMAL
SARS-COV-2: NOT DETECTED
SOURCE: NORMAL

## 2020-10-13 PROCEDURE — 6370000000 HC RX 637 (ALT 250 FOR IP): Performed by: PSYCHIATRY & NEUROLOGY

## 2020-10-13 PROCEDURE — 99232 SBSQ HOSP IP/OBS MODERATE 35: CPT | Performed by: INTERNAL MEDICINE

## 2020-10-13 PROCEDURE — 99223 1ST HOSP IP/OBS HIGH 75: CPT | Performed by: PSYCHIATRY & NEUROLOGY

## 2020-10-13 PROCEDURE — U0003 INFECTIOUS AGENT DETECTION BY NUCLEIC ACID (DNA OR RNA); SEVERE ACUTE RESPIRATORY SYNDROME CORONAVIRUS 2 (SARS-COV-2) (CORONAVIRUS DISEASE [COVID-19]), AMPLIFIED PROBE TECHNIQUE, MAKING USE OF HIGH THROUGHPUT TECHNOLOGIES AS DESCRIBED BY CMS-2020-01-R: HCPCS

## 2020-10-13 PROCEDURE — 1240000000 HC EMOTIONAL WELLNESS R&B

## 2020-10-13 RX ORDER — LAMOTRIGINE 25 MG/1
25 TABLET ORAL DAILY
Status: DISCONTINUED | OUTPATIENT
Start: 2020-10-13 | End: 2020-10-13

## 2020-10-13 RX ORDER — ARIPIPRAZOLE 15 MG/1
15 TABLET ORAL DAILY
Status: DISCONTINUED | OUTPATIENT
Start: 2020-10-13 | End: 2020-10-15 | Stop reason: HOSPADM

## 2020-10-13 RX ORDER — LAMOTRIGINE 150 MG/1
150 TABLET ORAL DAILY
Status: DISCONTINUED | OUTPATIENT
Start: 2020-10-13 | End: 2020-10-15 | Stop reason: HOSPADM

## 2020-10-13 RX ADMIN — IBUPROFEN 400 MG: 400 TABLET, FILM COATED ORAL at 21:25

## 2020-10-13 RX ADMIN — HYDROXYZINE HYDROCHLORIDE 50 MG: 50 TABLET, FILM COATED ORAL at 21:25

## 2020-10-13 RX ADMIN — HYDROXYZINE HYDROCHLORIDE 50 MG: 50 TABLET, FILM COATED ORAL at 07:56

## 2020-10-13 RX ADMIN — LAMOTRIGINE 150 MG: 150 TABLET ORAL at 11:32

## 2020-10-13 RX ADMIN — ARIPIPRAZOLE 15 MG: 15 TABLET ORAL at 11:32

## 2020-10-13 RX ADMIN — TRAZODONE HYDROCHLORIDE 50 MG: 50 TABLET ORAL at 21:25

## 2020-10-13 ASSESSMENT — PATIENT HEALTH QUESTIONNAIRE - PHQ9: SUM OF ALL RESPONSES TO PHQ QUESTIONS 1-9: 17

## 2020-10-13 ASSESSMENT — SLEEP AND FATIGUE QUESTIONNAIRES
DIFFICULTY ARISING: NO
AVERAGE NUMBER OF SLEEP HOURS: 4
DO YOU USE A SLEEP AID: NO
DIFFICULTY FALLING ASLEEP: YES
DO YOU HAVE DIFFICULTY SLEEPING: YES
SLEEP PATTERN: DISTURBED/INTERRUPTED SLEEP;DIFFICULTY FALLING ASLEEP;RESTLESSNESS
RESTFUL SLEEP: NO
DIFFICULTY STAYING ASLEEP: YES

## 2020-10-13 ASSESSMENT — LIFESTYLE VARIABLES: HISTORY_ALCOHOL_USE: NO

## 2020-10-13 ASSESSMENT — PAIN SCALES - GENERAL: PAINLEVEL_OUTOF10: 6

## 2020-10-13 NOTE — SUICIDE SAFETY PLAN
SAFETY PLAN    A suicide Safety Plan is a document that supports someone when they are having thoughts of suicide. Warning Signs that indicate a suicidal crisis may be developing: What (situations, thoughts, feelings, body sensations, behaviors, etc.) do you experience that lets you know you are beginning to think about suicide? Not taking my medications  Suicidal ideations or thoughts of death / dying  Depressed mood /sadness / loneliness / low self-esteem /feelings of worthlessness / emptiness  Racing thoughts / taking risks / doing impulsive behaviors or decision making  Poor sleep / lack of sleep/ oversleeping  Chronic irritability / angry mood / can't stop crying  Spending money I don't have repeatedly    Internal Coping Strategies:  What things can I do (relaxation techniques, hobbies, physical activities, etc.) to take my mind off my problems without contacting another person? Coping skills/ strategies - journal/ listen to music/ go for a walk/ read a book/ watch a funny movie/show / crafts / video game  Grounding techniques- eat a sour candy or hot cinnamon candy / focus on colors, sounds, smells, textures on things around you  Drink some tea  Take a hot bath or shower  Essential oils - smells  Meditate     People and social settings that provide distraction: Who can I call or where can I go to distract me? Parents, siblings, relatives, friends, /, , coworkers, support group, therapist, doctor - park, support group, gym, Congregational, etc    People whom I can ask for help: Who can I call when I need help - for example, friends, family, clergy, someone else? Alexi Castorena [phone]  Simona Eugene [phone]  Shavon Brown [phone]    Professionals or [facility] agencies I can contact during a crisis: Who can I call for help - for example, my doctor, my psychiatrist, my psychologist, a mental health provider, a suicide hotline?     Suicide Prevention Lifeline: 7-146-607-PVPJ (1055)    Local Behavioral Health Emergency Crisis Services Numbers:    [facility] [address]  [phone]    [address] [facility], [address] [place] [address]     Suicide Prevention Lifeline Phone: 8-628-463-SABG (6361)    Making the environment safe: How can I make my environment (house/apartment/living space) safer? Remove weapons, cutting objects, extra medications, household chemicals if you have ideations. Involve your support system to implement your safety plan. Call 911 immediately or go to your local Emergency Room if you cannot contract to be safe.

## 2020-10-13 NOTE — CONSULTS
[facility] Internal Medicine    CONSULTATION / HISTORY AND PHYSICAL EXAMINATION            Date:   10/13/2020  Patient name:  Kiera Hoffman  Date of admission:  10/12/2020  5:07 PM  MRN:   [id number]  Account:  [de-identified]  YOB: 1995  PCP:    Ricarda Burroughs MD  Room:   [address] [place] [address]  Code Status:    Full Code    Physician Requesting Consult: Sarah Scott MD    Reason for Consult: Testosterone injections    Chief Complaint:     Chief Complaint   Patient presents with   [address] Problem       History Obtained From:     patient, electronic medical record    History of Present Illness:   Patient admitted to [facility], with bipolar disorder, major depression  Patient is taking intramuscular testosterone injections every week, patient did not take extra strength likely over last 2 months ago . she follows with Dr. Kaelyn Stanley she mentioned that she is not on  Any other hormonal supplement    Past Medical History:     Past Medical History:   Diagnosis Date    Bipolar disorder without psychotic features ([facility]     Depression     Headache         Past Surgical History:     Past Surgical History:   Procedure Laterality Date    COLONOSCOPY N/A 11/4/2019    COLONOSCOPY WITH BIOPSY performed by Topher Merrill MD at [facility] END[facility]        Medications Prior to Admission:     Prior to Admission medications    Medication Sig Start Date End Date Taking? Authorizing Provider   lamoTRIgine (LAMICTAL) 25 MG tablet Take 25 mg by mouth daily Indications: total dose 125 mg daily   Yes Historical Provider, MD   testosterone cypionate (DEPOTESTOTERONE CYPIONATE) 200 MG/ML injection Inject 60 mg into the muscle. 0.3 mL = 60 mg 9/19/19  Yes Historical Provider, MD   ARIPiprazole (ABILIFY) 15 MG tablet Take 15 mg by mouth daily  8/11/19  Yes Historical Provider, MD        Allergies:     Pcn [penicillins];  Beef-derived products; and Pork-derived products    Social History:     Tobacco: reports that she has never smoked. She has never used smokeless tobacco.  Alcohol:      reports no history of alcohol use. Drug Use:  reports no history of drug use. Family History:     Family History   Problem Relation Age of Onset    No Known Problems Mother     Heart Disease Paternal Grandfather        Review of Systems:     Positive and Negative as described in HPI. CONSTITUTIONAL:  negative for fevers, chills, sweats, fatigue, weight loss  HEENT:  negative for vision, hearing changes, runny nose, throat pain  RESPIRATORY:  negative for shortness of breath, cough, congestion, wheezing. CARDIOVASCULAR:  negative for chest pain, palpitations. GASTROINTESTINAL:  negative for nausea, vomiting, diarrhea, constipation, change in bowel habits, abdominal pain   GENITOURINARY:  negative for difficulty of urination, burning with urination, frequency   INTEGUMENT:  negative for rash, skin lesions, easy bruising   HEMATOLOGIC/LYMPHATIC:  negative for swelling/edema   ALLERGIC/IMMUNOLOGIC:  negative for urticaria , itching  ENDOCRINE:  negative increase in drinking, increase in urination, hot or cold intolerance  MUSCULOSKELETAL:  negative joint pains, muscle aches, swelling of joints  NEUROLOGICAL:  negative for headaches, dizziness, lightheadedness, numbness, pain, tingling extremities  BEHAVIOR/PSYCH: Depressed    Physical Exam:     /72   Pulse 105   Temp 98 °F (36.7 °C)   Resp 14   Ht 5' 5\" (1.651 m)   Wt 138 lb (62.6 kg)   LMP 10/04/2020   SpO2 100%   BMI 22.96 kg/m²   Temp (24hrs), Av.8 °F (36.6 °C), Min:97.7 °F (36.5 °C), Max:98 °F (36.7 °C)    No results for input(s): POCGLU in the last 72 hours. No intake or output data in the 24 hours ending 10/13/20 1330    General Appearance:  alert, well appearing, and in no acute distress  Mental status: oriented to person, place, and time with normal affect  Head:  normocephalic, atraumatic.   Eye: no icterus, redness, pupils equal and reactive, extraocular eye movements intact, conjunctiva clear  Ear: normal external ear, no discharge, hearing intact  Nose:  no drainage noted  Mouth: mucous membranes moist  Neck: supple, no carotid bruits, thyroid not palpable  Lungs: Bilateral equal air entry, clear to ausculation, no wheezing, rales or rhonchi, normal effort  Cardiovascular: normal rate, regular rhythm, no murmur, gallop, rub.   Abdomen: Soft, nontender, nondistended, normal bowel sounds, no hepatomegaly or splenomegaly  Neurologic: There are no new focal motor or sensory deficits, normal muscle tone and bulk, no abnormal sensation, normal speech, cranial nerves II through XII grossly intact  Skin: No gross lesions, rashes, bruising or bleeding on exposed skin area  Extremities:  peripheral pulses palpable, no pedal edema or calf pain with palpation  Psych: normal affect    Investigations:      Laboratory Testing:  Recent Results (from the past 24 hour(s))   CBC with DIFF    Collection Time: 10/12/20  6:40 PM   Result Value Ref Range    WBC 7.2 3.5 - 11.0 k/uL    RBC 4.48 4.0 - 5.2 m/uL    Hemoglobin 14.3 12.0 - 16.0 g/dL    Hematocrit 41.6 36 - 46 %    MCV 93.0 80 - 100 fL    MCH 32.1 26 - 34 pg    MCHC 34.5 31 - 37 g/dL    RDW 13.3 11.5 - 14.9 %    Platelets 312 271 - 772 k/uL    MPV 8.9 6.0 - 12.0 fL    NRBC Automated NOT REPORTED per 100 WBC    Differential Type NOT REPORTED     Seg Neutrophils 75 (H) 36 - 66 %    Lymphocytes 18 (L) 24 - 44 %    Monocytes 6 1 - 7 %    Eosinophils % 0 0 - 4 %    Basophils 1 0 - 2 %    Immature Granulocytes NOT REPORTED 0 %    Segs Absolute 5.40 1.3 - 9.1 k/uL    Absolute Lymph # 1.30 1.0 - 4.8 k/uL    Absolute Mono # 0.40 0.1 - 1.3 k/uL    Absolute Eos # 0.00 0.0 - 0.4 k/uL    Basophils Absolute 0.00 0.0 - 0.2 k/uL    Absolute Immature Granulocyte NOT REPORTED 0.00 - 0.30 k/uL    WBC Morphology NOT REPORTED     RBC Morphology NOT REPORTED     Platelet Estimate NOT REPORTED    Comprehensive Metabolic Panel    Collection Time: 10/12/20  6:40 PM   Result Value Ref Range    Glucose 121 (H) 70 - 99 mg/dL    BUN 16 6 - 20 mg/dL    CREATININE 0.82 0.50 - 0.90 mg/dL    Bun/Cre Ratio NOT REPORTED 9 - 20    Calcium 9.5 8.6 - 10.4 mg/dL    Sodium 137 135 - 144 mmol/L    Potassium 3.6 (L) 3.7 - 5.3 mmol/L    Chloride 102 98 - 107 mmol/L    CO2 23 20 - 31 mmol/L    Anion Gap 12 9 - 17 mmol/L    Alkaline Phosphatase 48 35 - 104 U/L    ALT 24 5 - 33 U/L    AST 17 <32 U/L    Total Bilirubin 0.41 0.3 - 1.2 mg/dL    Total Protein 7.6 6.4 - 8.3 g/dL    Alb 4.6 3.5 - 5.2 g/dL    Albumin/Globulin Ratio NOT REPORTED 1.0 - 2.5    GFR Non-African American >60 >60 mL/min    GFR African American >60 >60 mL/min    GFR Comment          GFR Staging NOT REPORTED    TSH w/reflex to FT4    Collection Time: 10/12/20  6:40 PM   Result Value Ref Range    TSH 2.00 0.30 - 5.00 mIU/L   HCG Screen, Blood    Collection Time: 10/12/20  6:40 PM   Result Value Ref Range    hCG Qual NEGATIVE NEGATIVE       Imaging/Diagonstics:      Assessment :      Primary Problem  <principal problem not specified>    Active Hospital Problems    Diagnosis Date Noted    [facility] [F31.9] 10/13/2020    Major depression, chronic [F32.9] 10/12/2020    Severe major depression ([facility] [facility] [F32.2] 10/12/2020       Plan:     1. Patient not taking testosterone injection for last 2 months  2. Discussed with pharmacist, AndroGel is not available in the pharmacy, we cannot give testosterone injections when patient is in psych floor  3. Need to continue testosterone injection as outpatient  4. Discussed with RN    Consultations:   IP CONSULT TO HISTORY AND PHYSICAL  IP CONSULT TO INTERNAL MEDICINE      Delgado Ang MD  10/13/2020  1:30 PM    Copy sent to Dr. Lucinda Hubbard MD    Please note that this chart was generated using voice recognition Dragon dictation software.   Although every effort was made to ensure the accuracy of this automated transcription, some errors in transcription may have occurred.
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
## 2020-10-13 NOTE — H&P
HISTORY and [facility] [id number]       NAME:  Ester Eldridge  MRN: [id number]   YOB: 1995   Date: 10/13/2020   Age: 23 y.o. Gender: female     COMPLAINT AND PRESENT HISTORY:    Ester Eldridge is a 23 y.o.  female, admitted because of increasing depression with suicidal ideation. According to ED/ Admission notes, she came in with suicidal ideation. Patient is presently in a gender transition. She admits to depression and states she has been off of her medication for a month. Patient endorses sleep disturbances states that she has insomnia and her appetite fluctuates up and down. Patient also complains of poor concentration and racing thoughts. Patient denies any current alcohol or substance abuse except for Marijuana. Patient states that living arrangements after discharge will be live with her girlfriend. Patient has some somatic complaints of headache. She denies any light sensitivity or dizziness. No  chest pain or  shortness of breath. No fever/chills. Please see patient's psychiatric hx for more information.     DIAGNOSTIC RESULTS   Labs:  CBC:   Recent Labs     10/12/20  1840   WBC 7.2   HGB 14.3        BMP:    Recent Labs     10/12/20  1840      K 3.6*      CO2 23   BUN 16   CREATININE 0.82   GLUCOSE 121*     Hepatic:   Recent Labs     10/12/20  1840   AST 17   ALT 24   BILITOT 0.41   ALKPHOS 48     U/A:  Lab Results   Component Value Date    COLORU YELLOW 10/10/2019    SPECGRAV 1.010 10/10/2019    LEUKOCYTESUR NEGATIVE 10/10/2019    GLUCOSEU NEGATIVE 10/10/2019     PAST MEDICAL HISTORY     Past Medical History:   Diagnosis Date    Bipolar disorder without psychotic features ([facility] [address]     Depression     Headache      Pt denies any history of Diabetes mellitus type 2, hypertension, stroke, heart disease, COPD, Asthma, GERD, HLD, Cancer, Seizures,Thyroid disease, Kidney Disease, Hepatitis, TB.    SURGICAL HISTORY       Past Surgical History:   Procedure Laterality Date    COLONOSCOPY N/A 11/4/2019    COLONOSCOPY WITH BIOPSY performed by Emperatriz Read MD at [address]       Family History   Problem Relation Age of Onset    No Known Problems Mother     Heart Disease Paternal Grandfather        SOCIAL HISTORY       Social History     Socioeconomic History    Marital status: Single     Spouse name: None    Number of children: None    Years of education: None    Highest education level: None   Occupational History    None   Social Needs    Financial resource strain: None    Food insecurity     Worry: None     Inability: None    Transportation needs     Medical: None     Non-medical: None   Tobacco Use    Smoking status: Never Smoker    Smokeless tobacco: Never Used   Substance and Sexual Activity    Alcohol use: No    Drug use: No    Sexual activity: None   Lifestyle    Physical activity     Days per week: None     Minutes per session: None    Stress: None   Relationships    Social connections     Talks on phone: None     Gets together: None     Attends Faith service: None     Active member of club or organization: None     Attends meetings of clubs or organizations: None     Relationship status: None    Intimate partner violence     Fear of current or ex partner: None     Emotionally abused: None     Physically abused: None     Forced sexual activity: None   Other Topics Concern    None   Social History Narrative    None           REVIEW OF SYSTEMS      Allergies   Allergen Reactions    Pcn [Penicillins] Anaphylaxis and Hives    Beef-Derived Products     Pork-Derived Products        No current facility-administered medications on file prior to encounter.       Current Outpatient Medications on File Prior to Encounter   Medication Sig Dispense Refill    lamoTRIgine (LAMICTAL) 25 MG tablet Take 25 mg by mouth daily Indications: total dose 125 mg daily      testosterone cypionate (DEPOTESTOTERONE CYPIONATE) 200 MG/ML injection Inject 60 mg into the muscle. 0.3 mL = 60 mg  0    ARIPiprazole (ABILIFY) 15 MG tablet Take 15 mg by mouth daily   0                      General health:  Fairly good. No fever or chills. Skin:  No itching, redness or rash. Head, eyes, ears, nose, throat:  No headache, epistaxis, rhinorrhea hearing loss or sore throat. Neck:  No pain, stiffness or masses. Cardiovascular/Respiratory system:  No chest pain, palpitation, shortness of breath, coughing or expectoration. Gastrointestinal tract: No abdominal pain, nausea, vomiting, dysphagia, diarrhea or constipation. Genitourinary:  No burning on micturition. No hesitancy, urgency, frequency or discoloration of urine. Locomotor:  No bone or joint pains. No swelling or deformities. Neuropsychiatric:  See HPI. GENERAL PHYSICAL EXAM:     Vitals: /74   Pulse 82   Temp 97.8 °F (36.6 °C) (Oral)   Resp 14   Ht 5' 5\" (1.651 m)   Wt 138 lb (62.6 kg)   LMP 10/04/2020   SpO2 100%   BMI 22.96 kg/m²  Body mass index is 22.96 kg/m². Pt was examined with a nurse present in the room. GENERAL APPEARANCE:  Thierno Dong is 23 y.o.,  female, not obese, nourished, conscious, alert. Does not appear to be distress or pain at this time. SKIN:  Warm, dry, no cyanosis or jaundice. HEAD:  Normocephalic, atraumatic, no swelling or tenderness. EYES:  Pupils equal, reactive to light, Conjunctiva is clear, EOMs intact linda. eyelids WNL. EARS:  No discharge, no marked hearing loss. NOSE:  No rhinorrhea, epistaxis or septal deformity. THROAT:  Not congested. No ulceration bleeding or discharge. NECK:  No stiffness, trachea central.  No palpable masses or L.N.      CHEST:  Symmetrical and equal on expansion. HEART:  Regular rate and rhythm. S1 > S2, No audible murmurs or gallops.      LUNGS:  Equal on expansion, normal breath sounds. No adventitious sounds. ABDOMEN:  Soft on palpation. No localized tenderness. No guarding or rigidity. No palpable organomegaly. LYMPHATICS:  No palpable cervical Lymphadenopathy. LOCOMOTOR, BACK AND SPINE:  No tenderness or deformities. EXTREMITIES:  Symmetrical, no pretibial edema. Ursulas sign negative. No discoloration or ulcerations. NEUROLOGIC:  The patient is conscious, alert, oriented,Cranial nerve II-XII intact, taste and smell were not examined. No apparent focal sensory or motor deficits. Muscle strength equal Wang. No facial droop, tongue protrudes centrally, no slurring of the speech. PROVISIONAL DIAGNOSES:      Active Problems:    Major depression, chronic    Severe major depression (HCC)    Bipolar disorder (HCC)  Resolved Problems:    * No resolved hospital problems.  *      WALI CALDERA - CNP on 10/13/2020 at 12:00 PM

## 2020-10-13 NOTE — PLAN OF CARE
[address] [facility]  Initial Interdisciplinary Treatment Plan NO      Original treatment plan Date & Time: 10/13/2020  0730    Admission Type:  Admission Type: Voluntary    Reason for admission:   Reason for Admission: Suicidal thoughts    Estimated Length of Stay:  5-7days  Estimated Discharge Date: to be determined by physician    PATIENT STRENGTHS:  Patient Strengths:Strengths: Positive Support, Social Skills  Patient Strengths and Limitations:Limitations: Hopeless about future  Addictive Behavior: Addictive Behavior  In the past 3 months, have you felt or has someone told you that you have a problem with:  : None  Do you have a history of Chemical Use?: No  Do you have a history of Alcohol Use?: No  Do you have a history of Street Drug Abuse?: No  Histroy of Prescripton Drug Abuse?: No  Medical Problems:  Past Medical History:   Diagnosis Date    Bipolar disorder without psychotic features ([facility] [address]     Depression     Headache      Status EXAM:Status and Exam  Normal: No  Facial Expression: Sad  Affect: Appropriate  Level of Consciousness: Alert  Mood:Normal: No  Mood: Depressed  Motor Activity:Normal: Yes  Interview Behavior: Cooperative  Preception: [place] to Person, Thurnell Merle to Time, [place] to Place, [place] to Situation  Attention:Normal: No  Attention: Distractible  Thought Content:Normal: Yes  Thought Content: (denies)  Hallucinations: None  Delusions: No  Memory:Normal: Yes  Insight and Judgment: No  Insight and Judgment: Poor Judgment, Poor Insight  Present Suicidal Ideation: No  Present Homicidal Ideation: No    EDUCATION:   Learner Progress Toward Treatment Goals: reviewed group plans and strategies for care    Method:group therapy, medication compliance, individualized assessments and care planning    Outcome: needs reinforcement    PATIENT GOALS: to be discussed with patient within 72 hours    PLAN/TREATMENT RECOMMENDATIONS:     continue group therapy , medications compliance, goal setting, individualized assessments and care, continue to monitor pt on unit      SHORT-TERM GOALS:   Time frame for Short-Term Goals: 5-7 days    LONG-TERM GOALS:  Time frame for Long-Term Goals: 6 months  Members Present in Team Meeting: See Signature Sheet    TANYA Dias

## 2020-10-13 NOTE — PLAN OF CARE
Problem: Altered Mood, Depressive Behavior:  Goal: Able to verbalize acceptance of life and situations over which he or she has no control  Description: Able to verbalize acceptance of life and situations over which he or she has no control  10/13/2020 1157 by Madan Sal RN  Outcome: Ongoing     Problem: Altered Mood, Depressive Behavior:  Goal: Absence of self-harm  Description: Absence of self-harm  10/13/2020 1157 by Madan Sal RN  Outcome: Ongoing
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
## 2020-10-13 NOTE — ED NOTES
BLAYNE consulted with  from psychiatry. Pt accepted for an inpatient admission to the [facility] for safety and stabilization. Pt voluntarily signed self into the [facility]. Pt refusing COVID swab at this time.

## 2020-10-14 PROCEDURE — 6370000000 HC RX 637 (ALT 250 FOR IP): Performed by: PSYCHIATRY & NEUROLOGY

## 2020-10-14 PROCEDURE — 1240000000 HC EMOTIONAL WELLNESS R&B

## 2020-10-14 PROCEDURE — 99232 SBSQ HOSP IP/OBS MODERATE 35: CPT | Performed by: PSYCHIATRY & NEUROLOGY

## 2020-10-14 RX ADMIN — TRAZODONE HYDROCHLORIDE 50 MG: 50 TABLET ORAL at 20:53

## 2020-10-14 RX ADMIN — HYDROXYZINE HYDROCHLORIDE 50 MG: 50 TABLET, FILM COATED ORAL at 20:54

## 2020-10-14 RX ADMIN — LAMOTRIGINE 150 MG: 150 TABLET ORAL at 08:52

## 2020-10-14 RX ADMIN — ARIPIPRAZOLE 15 MG: 15 TABLET ORAL at 08:52

## 2020-10-14 ASSESSMENT — ENCOUNTER SYMPTOMS
COUGH: 0
ABDOMINAL PAIN: 0

## 2020-10-14 NOTE — PROGRESS NOTES
Daily Progress Note  Behavioral health  10/14/2020     CHIEF COMPLAINT: Depression and suicidal ideation    Reviewed patient's current plan of care and vital signs with nursing staff. Sleep: 7 hours last night  Attending groups: No    SUBJECTIVE:    Interviewed patient while walking in [place]. Per staff patient compliant with medications. Patient reports mood is slightly improved rates mood 5 out of 10 (10 best). He denies any suicidal ideation intent or plan. He denies any panic attacks today. He does report anxiety. He reports his sleep was improved last night and his appetite is improving. Patient denies any medication side effects. Mental Status Exam  Level of consciousness:  Within normal limits  Appearance: Dressed casually,  with good grooming and hygiene   Behavior/Motor: Calm, no psychomotor abnormalities  Attitude toward examiner:  Cooperative, attentive, good eye contact  Speech:  spontaneous, normal rate, normal volume and well articulated  Mood: \"Depressed\" improved since yesterday  Affect: Congruent with stated mood  Thought processes:  linear, goal directed and coherent  Thought content:  denies homicidal ideation  Suicidal Ideation: Denies suicidal ideation  Delusions:  no evidence of delusions  Perceptual Disturbance:  denies any perceptual disturbance  Cognition:  Oriented to self, location, time, and situation  Memory: age appropriate  Insight & Judgement: improving  Medication side effects:  denies       Data   height is 5' 5\" (1.651 m) and weight is 138 lb (62.6 kg). Her oral temperature is 98 °F (36.7 °C). Her blood pressure is 111/76 and her pulse is 98. Her respiration is 14 and oxygen saturation is 100%.    Labs:   Admission on 10/12/2020   Component Date Value Ref Range Status    WBC 10/12/2020 7.2  3.5 - 11.0 k/uL Final    RBC 10/12/2020 4.48  4.0 - 5.2 m/uL Final    Hemoglobin 10/12/2020 14.3  12.0 - 16.0 g/dL Final    Hematocrit 10/12/2020 41.6  36 - 46 % Final    MCV 10/12/2020 93.0  80 - 100 fL Final    MCH 10/12/2020 32.1  26 - 34 pg Final    MCHC 10/12/2020 34.5  31 - 37 g/dL Final    RDW 10/12/2020 13.3  11.5 - 14.9 % Final    Platelets 38/46/3702 173  150 - 450 k/uL Final    MPV 10/12/2020 8.9  6.0 - 12.0 fL Final    NRBC Automated 10/12/2020 NOT REPORTED  per 100 WBC Final    Differential Type 10/12/2020 NOT REPORTED   Final    Seg Neutrophils 10/12/2020 75* 36 - 66 % Final    Lymphocytes 10/12/2020 18* 24 - 44 % Final    Monocytes 10/12/2020 6  1 - 7 % Final    Eosinophils % 10/12/2020 0  0 - 4 % Final    Basophils 10/12/2020 1  0 - 2 % Final    Immature Granulocytes 10/12/2020 NOT REPORTED  0 % Final    Segs Absolute 10/12/2020 5.40  1.3 - 9.1 k/uL Final    Absolute Lymph # 10/12/2020 1.30  1.0 - 4.8 k/uL Final    Absolute Mono # 10/12/2020 0.40  0.1 - 1.3 k/uL Final    Absolute Eos # 10/12/2020 0.00  0.0 - 0.4 k/uL Final    Basophils Absolute 10/12/2020 0.00  0.0 - 0.2 k/uL Final    Absolute Immature Granulocyte 10/12/2020 NOT REPORTED  0.00 - 0.30 k/uL Final    WBC Morphology 10/12/2020 NOT REPORTED   Final    RBC Morphology 10/12/2020 NOT REPORTED   Final    Platelet Estimate 73/84/7665 NOT REPORTED   Final    Glucose 10/12/2020 121* 70 - 99 mg/dL Final    BUN 10/12/2020 16  6 - 20 mg/dL Final    CREATININE 10/12/2020 0.82  0.50 - 0.90 mg/dL Final    Bun/Cre Ratio 10/12/2020 NOT REPORTED  9 - 20 Final    Calcium 10/12/2020 9.5  8.6 - 10.4 mg/dL Final    Sodium 10/12/2020 137  135 - 144 mmol/L Final    Potassium 10/12/2020 3.6* 3.7 - 5.3 mmol/L Final    Chloride 10/12/2020 102  98 - 107 mmol/L Final    CO2 10/12/2020 23  20 - 31 mmol/L Final    Anion Gap 10/12/2020 12  9 - 17 mmol/L Final    Alkaline Phosphatase 10/12/2020 48  35 - 104 U/L Final    ALT 10/12/2020 24  5 - 33 U/L Final    AST 10/12/2020 17  <32 U/L Final    Total Bilirubin 10/12/2020 0.41  0.3 - 1.2 mg/dL Final    Total Protein 10/12/2020 7.6  6.4 - 8.3 g/dL Final    SARS-CoV-2, Rapid 10/13/2020        Final    Source 10/13/2020 . NASOPHARYNGEAL SWAB   Final    SARS-CoV-2 10/13/2020        Final            Medications  Current Facility-Administered Medications: ARIPiprazole (ABILIFY) tablet 15 mg, 15 mg, Oral, Daily  lamoTRIgine (LAMICTAL) tablet 150 mg, 150 mg, Oral, Daily  acetaminophen (TYLENOL) tablet 650 mg, 650 mg, Oral, Q4H PRN  aluminum & magnesium hydroxide-simethicone (MAALOX) 200-200-20 MG/5ML suspension 30 mL, 30 mL, Oral, Q6H PRN  hydrOXYzine (ATARAX) tablet 50 mg, 50 mg, Oral, TID PRN  ibuprofen (ADVIL;MOTRIN) tablet 400 mg, 400 mg, Oral, Q6H PRN  traZODone (DESYREL) tablet 50 mg, 50 mg, Oral, Nightly PRN  polyethylene glycol (GLYCOLAX) packet 17 g, 17 g, Oral, Daily PRN  nicotine polacrilex (NICORETTE) gum 2 mg, 2 mg, Oral, PRN    ASSESSMENT  Severe major depression ([address]     PLAN  Patient s symptoms improving  Continue current medications  Attempt to develop insight  Psycho-education conducted. Supportive Therapy conducted. Probable discharge is per MD  Internal medicine consult completed, patient to continue testosterone therapy outpatient, as hospital does not have testosterone per Dr. Farideh Manriquez H&P  Follow-up daily while inpatient    Electronically signed by WALI Villa CNP on 10/14/20 at 1:50 PM EDT    **This report has been created using voice recognition software. It may contain minor errors which are inherent in voice recognition technology. **    I independently saw and evaluated the patient. I reviewed the nurse practitioners documentation above. Any additional comments or changes to the nurse practitioners documentation are stated below otherwise agree with assessment. Patient is tolerating his medications. Patient reports slight improvement in mood  Patient s symptoms   are improving    PLAN  No changes to medications  Attempt to develop insight  Psycho-education conducted. Supportive Therapy conducted.   Probable discharge is in 1 to 2 days  Follow-up daily while on inpatient unit    Electronically signed by Marco Metzger MD on 10/14/20 at 6:02 PM EDT

## 2020-10-14 NOTE — PLAN OF CARE
5 [facility]  Day 3 Interdisciplinary Treatment Plan NOTE    Review Date & Time: 10/14/2020   1603    Admission Type:   Admission Type: Voluntary    Reason for admission:  Reason for Admission: Suicidal thoughts  Estimated Length of Stay: 5-7 days  Estimated Discharge Date Update: to be determined by physician    PATIENT STRENGTHS:  Patient Strengths Strengths: Positive Support, Social Skills, Connection to output provider, Communication, Motivated  Patient Strengths and Limitations:Limitations: Difficulty problem solving/relies on others to help solve problems, Difficult relationships / poor social skills, Lacks leisure interests, General negative or hopeless attitude about future/recovery  Addictive Behavior:Addictive Behavior  In the past 3 months, have you felt or has someone told you that you have a problem with:  : Shopping  Do you have a history of Chemical Use?: No  Do you have a history of Alcohol Use?: No  Do you have a history of Street Drug Abuse?: No  Histroy of Prescripton Drug Abuse?: No  Medical Problems:  Past Medical History:   Diagnosis Date    Bipolar disorder without psychotic features ([facility] [address]     Depression     Headache        Risk:  Fall RiskTotal: 53  Kiran Scale Kiran Scale Score: 22  BVC Total: 0  Change in scores no Changes to plan of Care no    Status EXAM:   Status and Exam  Normal: No  Facial Expression: Flat  Affect: Blunt  Level of Consciousness: Alert  Mood:Normal: No  Mood: Depressed, Anxious  Motor Activity:Normal: No  Motor Activity: Decreased  Interview Behavior: Cooperative  Preception: [place] to Person, Jackson Sovereign to Time, [place] to Place, [place] to Situation  Attention:Normal: No  Attention: Distractible  Thought Processes: Circumstantial  Thought Content:Normal: No  Thought Content: Preoccupations  Hallucinations: None  Delusions: No  Memory:Normal: Yes  Insight and Judgment: No  Insight and Judgment: Poor Judgment, Poor Insight  Present Suicidal Ideation: Brennan Jenkins

## 2020-10-14 NOTE — PLAN OF CARE
Problem: Altered Mood, Depressive Behavior:  Goal: Able to verbalize acceptance of life and situations over which he or she has no control  Description: Able to verbalize acceptance of life and situations over which he or she has no control  Outcome: Ongoing  Goal: Absence of self-harm  Description: Absence of self-harm  Outcome: Ongoing  Note: Patient denies any thoughts of harm to self or others. Patient reports depression and anxiety is 5/10. Patient is somewhat isolative to room. Out for meals, needs, and television periodically. Visual safety checks are completed every fifteen minutes.

## 2020-10-15 VITALS
RESPIRATION RATE: 14 BRPM | BODY MASS INDEX: 22.99 KG/M2 | OXYGEN SATURATION: 100 % | WEIGHT: 138 LBS | HEIGHT: 65 IN | TEMPERATURE: 98.3 F | HEART RATE: 103 BPM | DIASTOLIC BLOOD PRESSURE: 64 MMHG | SYSTOLIC BLOOD PRESSURE: 105 MMHG

## 2020-10-15 PROCEDURE — 6370000000 HC RX 637 (ALT 250 FOR IP): Performed by: PSYCHIATRY & NEUROLOGY

## 2020-10-15 PROCEDURE — 99239 HOSP IP/OBS DSCHRG MGMT >30: CPT | Performed by: PSYCHIATRY & NEUROLOGY

## 2020-10-15 RX ORDER — ARIPIPRAZOLE 15 MG/1
15 TABLET ORAL DAILY
Qty: 30 TABLET | Refills: 0 | Status: SHIPPED | OUTPATIENT
Start: 2020-10-15 | End: 2022-08-29

## 2020-10-15 RX ORDER — HYDROXYZINE 50 MG/1
50 TABLET, FILM COATED ORAL 3 TIMES DAILY PRN
Qty: 30 TABLET | Refills: 0 | Status: SHIPPED | OUTPATIENT
Start: 2020-10-15 | End: 2020-10-25

## 2020-10-15 RX ORDER — LAMOTRIGINE 150 MG/1
150 TABLET ORAL DAILY
Qty: 30 TABLET | Refills: 0 | Status: SHIPPED | OUTPATIENT
Start: 2020-10-15 | End: 2022-08-29

## 2020-10-15 RX ADMIN — ARIPIPRAZOLE 15 MG: 15 TABLET ORAL at 08:20

## 2020-10-15 RX ADMIN — LAMOTRIGINE 150 MG: 150 TABLET ORAL at 08:20

## 2020-10-15 NOTE — DISCHARGE SUMMARY
DISCHARGE SUMMARY      Patient ID:  Dereje Mayo  [id number]  3 y.o.  1995    Admit date: 10/12/2020    Discharge date and time: 10/15/2020    Disposition: Home     Admitting Physician: Oly Carroll MD     Discharge Physician: Dr Deon Sosa MD    Admission Diagnoses: Major depression, chronic [F32.9]  Severe major depression ([facility] [address] [F32.2]  Bipolar disorder ([facility] [address] [F31.9]    Admission Condition: poor    Discharged Condition: stable    Admission Circumstance: Patient was admitted to psychiatry after presenting to ER with suicidal thoughts. Patient is a female to male transgender. He has been disowned by his family.     PAST MEDICAL/PSYCHIATRIC HISTORY:   Past Medical History:   Diagnosis Date    Bipolar disorder without psychotic features ([facility] [address]     Depression     Headache        FAMILY/SOCIAL HISTORY:  Family History   Problem Relation Age of Onset    No Known Problems Mother     Heart Disease Paternal Grandfather      Social History     Socioeconomic History    Marital status: Single     Spouse name: Not on file    Number of children: Not on file    Years of education: Not on file    Highest education level: Not on file   Occupational History    Not on file   Social Needs    Financial resource strain: Not on file    Food insecurity     Worry: Not on file     Inability: Not on file    Transportation needs     Medical: Not on file     Non-medical: Not on file   Tobacco Use    Smoking status: Never Smoker    Smokeless tobacco: Never Used   Substance and Sexual Activity    Alcohol use: No    Drug use: No    Sexual activity: Not on file   Lifestyle    Physical activity     Days per week: Not on file     Minutes per session: Not on file    Stress: Not on file   Relationships    Social connections     Talks on phone: Not on file     Gets together: Not on file     Attends Shinto service: Not on file     Active member of club or organization: Not on file     Attends meetings of clubs or organizations: Not on file     Relationship status: Not on file    Intimate partner violence     Fear of current or ex partner: Not on file     Emotionally abused: Not on file     Physically abused: Not on file     Forced sexual activity: Not on file   Other Topics Concern    Not on file   Social History Narrative    Not on file       MEDICATIONS:    Current Facility-Administered Medications:     ARIPiprazole (ABILIFY) tablet 15 mg, 15 mg, Oral, Daily, Tom Rocha MD, 15 mg at 10/15/20 0820    lamoTRIgine (LAMICTAL) tablet 150 mg, 150 mg, Oral, Daily, Tom Rocha MD, 150 mg at 10/15/20 0820    acetaminophen (TYLENOL) tablet 650 mg, 650 mg, Oral, Q4H PRN, Leopoldo Penn MD    aluminum & magnesium hydroxide-simethicone (MAALOX) 200-200-20 MG/5ML suspension 30 mL, 30 mL, Oral, Q6H PRN, Leopoldo Penn MD    hydrOXYzine (ATARAX) tablet 50 mg, 50 mg, Oral, TID PRN, Leopoldo Penn MD, 50 mg at 10/14/20 2054    ibuprofen (ADVIL;MOTRIN) tablet 400 mg, 400 mg, Oral, Q6H PRN, Leopoldo Penn MD, 400 mg at 10/13/20 2125    traZODone (DESYREL) tablet 50 mg, 50 mg, Oral, Nightly PRN, Leopoldo Penn MD, 50 mg at 10/14/20 2053    polyethylene glycol (GLYCOLAX) packet 17 g, 17 g, Oral, Daily PRN, Leopoldo Penn MD    nicotine polacrilex (NICORETTE) gum 2 mg, 2 mg, Oral, PRN, Leopoldo Penn MD    Examination:  /64   Pulse 103   Temp 98.3 °F (36.8 °C) (Oral)   Resp 14   Ht 5' 5\" (1.651 m)   Wt 138 lb (62.6 kg)   LMP 10/04/2020   SpO2 100%   BMI 22.96 kg/m²   Gait - steady    HOSPITAL COURSE[de-identified]  Following admission to the hospital, patient had a complete physical exam and blood work up, which was unremarkable. Patient was monitored closely with suicide precaution  Patient was started on Abilify as noted above.   The patient's Lamictal was increased to 150 mg daily  Was encouraged to participate in group and other milieu activity  Patient started to feel better with this combination of treatment. Significant progress in the symptoms since admission. Mood is improved  The patient denies AVH or paranoid thoughts  The patient denies any hopelessness or worthlessness  No active SI/HI  Appetite:  [x] Normal  [] Increased  [] Decreased    Sleep:       [x] Normal  [] Fair       [] Poor            Energy:    [x] Normal  [] Increased  [] Decreased     SI [] Present  [x] Absent  HI  []Present  [x] Absent   Aggression:  [] yes  [] no  Patient is [x] able  [] unable to CONTRACT FOR SAFETY   Medication side effects(SE):  [x] None(Psych.  Meds.) [] Other      Mental Status Examination on discharge:    Level of consciousness:  within normal limits   Appearance:  well-appearing  Behavior/Motor:  no abnormalities noted  Attitude toward examiner:  attentive and good eye contact  Speech:  spontaneous, normal rate and normal volume   Mood: euthymic  Affect:  mood congruent  Thought processes:  linear, goal directed and coherent   Thought content:  Suicidal Ideation:  denies suicidal ideation  Delusions:  no evidence of delusions  Perceptual Disturbance:  denies any perceptual disturbance  Cognition:  oriented to person, place, and time   Concentration intact  Memory intact  Insight good   Judgement fair   Fund of Knowledge adequate      ASSESSMENT:  Patient symptoms are:  [x] Well controlled  [x] Improving  [] Worsening  [] No change      Diagnosis: Bipolar depression      LABS:    Recent Labs     10/12/20  1840   WBC 7.2   HGB 14.3        Recent Labs     10/12/20  1840      K 3.6*      CO2 23   BUN 16   CREATININE 0.82   GLUCOSE 121*     Recent Labs     10/12/20  1840   BILITOT 0.41   ALKPHOS 48   AST 17   ALT 24     Lab Results   Component Value Date    BARBSCNU NEGATIVE 10/31/2016    LABBENZ NEGATIVE 10/31/2016    LABMETH NEGATIVE 10/31/2016    PPXUR NOT REPORTED 10/31/2016     Lab Results   Component Value Date    TSH 2.00 10/12/2020     No results found for: LITHIUM  No results found for: VALPROATE, CBMZ    RISK ASSESSMENT AT DISCHARGE: Low risk for suicide and homicide. Treatment Plan:  Reviewed current Medications with the patient. Education provided on the complaince with treatment. Risks, benefits, side effects, drug-to-drug interactions and alternatives to treatment were discussed. Encourage patient to attend outpatient follow up appointment and therapy. Patient was advised to call the outpatient provider, visit the nearest ED or call 911 if symptoms are not manageable. Medication List      START taking these medications    hydrOXYzine 50 MG tablet  Commonly known as:  ATARAX  Take 1 tablet by mouth 3 times daily as needed for Anxiety        CHANGE how you take these medications    lamoTRIgine 150 MG tablet  Commonly known as: LaMICtal  Take 1 tablet by mouth daily  What changed:    · medication strength  · how much to take  · Another medication with the same name was removed. Continue taking this medication, and follow the directions you see here.         CONTINUE taking these medications    ARIPiprazole 15 MG tablet  Commonly known as:  ABILIFY  Take 1 tablet by mouth daily     testosterone cypionate 200 MG/ML injection  Commonly known as:  DEPOTESTOTERONE CYPIONATE        STOP taking these medications    ibuprofen 200 MG tablet  Commonly known as:  ADVIL;MOTRIN     polycarbophil 625 MG tablet  Commonly known as:  FIBERCON     Wrist Splint/Cock-Up/Right M Misc           Where to Get Your Medications      These medications were sent to [id number] - [place] [address] [place] 1945, [address] [facility] [address] [facility]  [facility] [address] [address]    Phone:  [phone]   · ARIPiprazole 15 MG tablet  · hydrOXYzine 50 MG tablet  · lamoTRIgine 150 MG tablet           TIME SPENT - 35 MINUTES TO COMPLETE THE EVALUATION, DISCHARGE SUMMARY, MEDICATION RECONCILIATION AND FOLLOW UP CARE Emily Carroll is a 23 y.o. female being evaluated Zak Maciel MD on 10/15/2020 at 12:54 PM    An electronic signature was used to authenticate this note. **This report has been created using voice recognition software. It may contain minor errors which are inherent in voice recognition technology. **

## 2020-10-15 NOTE — GROUP NOTE
Group Therapy Note    Date: 10/15/2020    Group Start Time: 0900  Group End Time: 0945  Group Topic: Community Meeting    RABIA WEISS    [facility]        Group Therapy Note    Attendees: 10/23         Pt did not participate in Community Meeting/Goals Group at 900am when encouraged by RT due to resting in room. Pt was offered talk time as an alternative to group but declined.         Discipline Responsible: Psychoeducational Specialist        Signature:  Donny Cunningham

## 2020-10-15 NOTE — BH NOTE
[address] [facility]  Discharge Note    Pt discharged with followings belongings:   Dentures: None  Vision - Corrective Lenses: None  Hearing Aid: None  Jewelry: None  Body Piercings Removed: No  Clothing: Footwear, Pants, Shirt, Sweater, Socks, Undergarments (Comment)  Were All Patient Medications Collected?: Not Applicable  Other Valuables: Other (Comment)(ID)   Valuables sent home with paatient . Valuables retrieved from safe, Security envelope number:  [id number] and returned to patient. Patient education on aftercare instructions: yes  Information faxed to [facility] by staff Patient verbalize understanding of AVS:  Yes . Patient discharged to home.    Status EXAM upon discharge:  Status and Exam  Normal: No  Facial Expression: Flat  Affect: Blunt  Level of Consciousness: Alert  Mood:Normal: No  Mood: Sad, Helpless  Motor Activity:Normal: Yes  Motor Activity: Decreased  Interview Behavior: Cooperative  Preception: [place] to Person, Tammy Colonel to Time, [place]oo[place] to Place, [place] to Situation  Attention:Normal: Yes  Attention: Distractible  Thought Processes: Circumstantial  Thought Content:Normal: Yes  Thought Content: Preoccupations  Hallucinations: None  Delusions: No  Memory:Normal: Yes  Insight and Judgment: No  Insight and Judgment: Poor Judgment, Poor Insight  Present Suicidal Ideation: No  Present Homicidal Ideation: No      Metabolic Screening:    No results found for: LABA1C    Lab Results   Component Value Date    CHOL 202 (H) 07/09/2020    CHOL 183 06/21/2019     Lab Results   Component Value Date    TRIG 58 07/09/2020    TRIG 54 06/21/2019     Lab Results   Component Value Date    HDL 76 07/09/2020    HDL 82 06/21/2019     No components found for: LDLCAL  No results found for: Real Avalos RN
Patient is a non smoker.
Patient is complaining of anxiety at this time. Stating that they feel restless and are having trouble sleeping and calming down in order to rest this evening. Medication was given as prescribed for increased anxiety see MAR.
Patient was seen today by Dr. Marquez Rincon via telehealth.
RN has reviewed the [facility] charting
RN reviewed LPN charting
Transfer Note:   Pt transferred to [facility] unit Room 234. Belongings sent with pt. Report called to RN, [facility] staff. Pt oriented to unit policies and procedures. Pt denies thoughts of self harm at time of transfer and verbalizes understanding of transfer.
Center                                           ( ) Patient refused counseling  ( x) Patient has not smoked in the last 30 days    Metabolic Screening:    No results found for: LABA1C    Lab Results   Component Value Date    CHOL 202 (H) 07/09/2020    CHOL 183 06/21/2019     Lab Results   Component Value Date    TRIG 58 07/09/2020    TRIG 54 06/21/2019     Lab Results   Component Value Date    HDL 76 07/09/2020    HDL 82 06/21/2019     No components found for: LDLCAL  No results found for: LABVLDL      Body mass index is 22.96 kg/m². BP Readings from Last 2 Encounters:   10/12/20 103/74   01/08/20 102/62           Pt admitted with followings belongings:  Dentures: None  Vision - Corrective Lenses: None  Hearing Aid: None  Jewelry: (nose ring)  Body Piercings Removed: No  Clothing: Footwear, Pants, Shirt, Sweater, Socks, Undergarments (Comment)  Were All Patient Medications Collected?: Not Applicable  Other Valuables: Other (Comment)(ID)     Valuables sent to security. Valuables placed in safe in security envelope, number: [id number] . Patient's home medications were not varified. Patient oriented to surroundings and program expectations and copy of patient rights given. Received admission packet:  yes. Consents reviewed, signed yes. Refused no. Patient verbalize understanding:  yes. Patient education on precautions: yes    Patient oriented to unit, rounding procedures explained, offered meal. Patient was cooperative.                     Rama Lentz RN

## 2020-10-15 NOTE — GROUP NOTE
Group Therapy Note    Date: 10/15/2020    Group Start Time: 1100  Group End Time: 6482  Group Topic: Cognitive Skills    RABIA BHDAVE Ragland, CTRS    Pt did not attend 1100 skills group d/t resting in room despite staff invitation to attend. 1:1 talk time offered as alternative to group session, pt declined.             Signature:  Chavez Strickland

## 2020-10-15 NOTE — PLAN OF CARE
Problem: Altered Mood, Depressive Behavior:  Goal: Able to verbalize acceptance of life and situations over which he or she has no control  Description: Able to verbalize acceptance of life and situations over which he or she has no control  10/14/2020 2151 by Maribel Yan LPN  Outcome: Ongoing     Problem: Altered Mood, Depressive Behavior:  Goal: Absence of self-harm  Description: Absence of self-harm  10/14/2020 2151 by Maribel Yan LPN  Outcome: Ongoing   Patient denies suicidal ideas at this time. Patient denies homicidal ideas at this time. Patient denies depressive symptoms at this time. Patient has been out in day room watching tv, playing games, and socializing with peers. Patient is free of self harm at this time. Patient agrees to seek out staff if thoughts to harm self arise. Staff will provide support and reassurance as needed. Safety checks maintained every 15 minutes.

## 2020-10-15 NOTE — GROUP NOTE
Group Therapy Note    Date: 10/15/2020    Group Start Time: 1330  Group End Time: 5661  Group Topic: Cognitive Skills    RABIA Koehler, XOCHITLS    Pt did not attend 1330 cognitive skills group d/t resting in room despite staff invitation to attend. 1:1 talk time offered as alternative to group session, pt declined.             Signature:  Laurie Butler

## 2020-10-15 NOTE — PROGRESS NOTES
4.6  3.5 - 5.2 g/dL Final    Albumin/Globulin Ratio 10/12/2020 NOT REPORTED  1.0 - 2.5 Final    GFR Non- 10/12/2020 >60  >60 mL/min Final    GFR  10/12/2020 >60  >60 mL/min Final    GFR Comment 10/12/2020        Final    Comment: Average GFR for 2129 years old:   116 mL/min/1.73sq m  Chronic Kidney Disease:   <60 mL/min/1.73sq m  Kidney failure:   <15 mL/min/1.73sq m              eGFR calculated using average adult body mass. Additional eGFR calculator available at:        [employer]            GFR Staging 10/12/2020 NOT REPORTED   Final    TSH 10/12/2020 2.00  0.30 - 5.00 mIU/L Final    hCG Qual 10/12/2020 NEGATIVE  NEGATIVE Final    Comment: Specimens with hCG levels near the threshold of the test (25 mIU/mL) may give a negative or   indeterminate result. In such cases, another test should be performed with a new specimen   in 48-72 hours. If early pregnancy is suspected clinically in this setting, correlation   with quantitative serum b-hCG level is suggested.  SARS-CoV-2 10/13/2020 Not Detected  Not Detected Final    Comment:       The specimen is NEGATIVE for SARS-CoV-2, the novel coronavirus associated with COVID-19. A negative result does not rule out COVID-19. This test has been authorized by the FDA under an Emergency Use Authorization (EUA) for use   by authorized laboratories. [employer] SARS-CoV-2 Reagents for BD MAX System are designed to detect the virus that causes   COVID-19 in patients with signs and symptoms of infection who are suspected of COVID-19. An individual without symptoms of COVID-19 and who is not shedding SARS-CoV-2 virus would   expect to have a negative (not detected) result in this assay.   Fact sheet for Healthcare Providers: [employer].com  Fact sheet for Patients: kstattoo.com        METHODOLOGY: RT-PCR      SARS-CoV-2, Rapid 10/13/2020        Final    Source 10/13/2020 . NASOPHARYNGEAL SWAB   Final    SARS-CoV-2 10/13/2020        Final            Medications  Current Facility-Administered Medications: ARIPiprazole (ABILIFY) tablet 15 mg, 15 mg, Oral, Daily  lamoTRIgine (LAMICTAL) tablet 150 mg, 150 mg, Oral, Daily  acetaminophen (TYLENOL) tablet 650 mg, 650 mg, Oral, Q4H PRN  aluminum & magnesium hydroxide-simethicone (MAALOX) 200-200-20 MG/5ML suspension 30 mL, 30 mL, Oral, Q6H PRN  hydrOXYzine (ATARAX) tablet 50 mg, 50 mg, Oral, TID PRN  ibuprofen (ADVIL;MOTRIN) tablet 400 mg, 400 mg, Oral, Q6H PRN  traZODone (DESYREL) tablet 50 mg, 50 mg, Oral, Nightly PRN  polyethylene glycol (GLYCOLAX) packet 17 g, 17 g, Oral, Daily PRN  nicotine polacrilex (NICORETTE) gum 2 mg, 2 mg, Oral, PRN    ASSESSMENT  Severe major depression (HCC)     PLAN  Discussed plan with Dr. Diez  Patient s symptoms improving  Continue current medications  Attempt to develop insight  Psycho-education conducted. Supportive Therapy conducted. Follow-up daily while inpatient        **This report has been created using voice recognition software. It may contain minor errors which are inherent in voice recognition technology. **

## 2020-10-15 NOTE — PLAN OF CARE
Problem: Altered Mood, Depressive Behavior:  Goal: Able to verbalize acceptance of life and situations over which he or she has no control  Description: Able to verbalize acceptance of life and situations over which he or she has no control  10/15/2020 1009 by Amada Sylvester  Outcome: Ongoing  Patient is cooperative. Denies any depressive symptoms or anxiety. Denies any auditory or visual hallucinations. Denies any suicidal or homicidal ideation. Medication compliant. Isolative to room majority of today. Q15 minute safety checks maintained. Problem: Altered Mood, Depressive Behavior:  Goal: Absence of self-harm  Description: Absence of self-harm  10/15/2020 1009 by Amada Sylvester  Outcome: Ongoing  No self-harming behaviors observed or noted.

## 2020-11-03 PROBLEM — F32.A DEPRESSION: Status: RESOLVED | Noted: 2020-11-03 | Resolved: 2020-11-03

## 2021-04-07 ENCOUNTER — HOSPITAL ENCOUNTER (OUTPATIENT)
Age: 26
Setting detail: SPECIMEN
Discharge: HOME | End: 2021-04-07

## 2021-04-07 LAB
ESTRADIOL LEVEL: 122 PG/ML (ref 27–314)
HCT VFR BLD CALC: 43.6 % (ref 36.3–47.1)
HEMOGLOBIN: 14 G/DL (ref 11.9–15.1)
MCH RBC QN AUTO: 30.6 PG (ref 25.2–33.5)
MCHC RBC AUTO-ENTMCNC: 32.1 G/DL (ref 28.4–34.8)
MCV RBC AUTO: 95.2 FL (ref 82.6–102.9)
NRBC AUTOMATED: 0 PER 100 WBC
PDW BLD-RTO: 12.6 % (ref 11.8–14.4)
PLATELET # BLD: 218 K/UL (ref 138–453)
PMV BLD AUTO: 11.7 FL (ref 8.1–13.5)
RBC # BLD: 4.58 M/UL (ref 3.95–5.11)
TESTOSTERONE TOTAL: 36 NG/DL (ref 20–70)
WBC # BLD: 5.6 K/UL (ref 3.5–11.3)

## 2022-02-08 ENCOUNTER — APPOINTMENT (OUTPATIENT)
Dept: GENERAL RADIOLOGY | Age: 27
End: 2022-02-08
Payer: COMMERCIAL

## 2022-02-08 ENCOUNTER — HOSPITAL ENCOUNTER (EMERGENCY)
Age: 27
Discharge: HOME | End: 2022-02-08
Attending: EMERGENCY MEDICINE
Payer: COMMERCIAL

## 2022-02-08 VITALS
HEART RATE: 94 BPM | SYSTOLIC BLOOD PRESSURE: 122 MMHG | RESPIRATION RATE: 18 BRPM | OXYGEN SATURATION: 98 % | HEIGHT: 65 IN | BODY MASS INDEX: 24.99 KG/M2 | WEIGHT: 150 LBS | DIASTOLIC BLOOD PRESSURE: 90 MMHG | TEMPERATURE: 98.3 F

## 2022-02-08 DIAGNOSIS — R07.9 CHEST PAIN, UNSPECIFIED TYPE: Primary | ICD-10-CM

## 2022-02-08 LAB
ABSOLUTE EOS #: 0 K/UL (ref 0–0.4)
ABSOLUTE IMMATURE GRANULOCYTE: ABNORMAL K/UL (ref 0–0.3)
ABSOLUTE LYMPH #: 2.43 K/UL (ref 1–4.8)
ABSOLUTE MONO #: 0.54 K/UL (ref 0.1–1.3)
ANION GAP SERPL CALCULATED.3IONS-SCNC: 11 MMOL/L (ref 9–17)
BASOPHILS # BLD: 2 % (ref 0–2)
BASOPHILS ABSOLUTE: 0.11 K/UL (ref 0–0.2)
BUN BLDV-MCNC: 15 MG/DL (ref 6–20)
BUN/CREAT BLD: NORMAL (ref 9–20)
CALCIUM SERPL-MCNC: 9.7 MG/DL (ref 8.6–10.4)
CHLORIDE BLD-SCNC: 102 MMOL/L (ref 98–107)
CO2: 26 MMOL/L (ref 20–31)
CREAT SERPL-MCNC: 0.81 MG/DL (ref 0.5–0.9)
DIFFERENTIAL TYPE: ABNORMAL
EOSINOPHILS RELATIVE PERCENT: 0 % (ref 0–4)
GFR AFRICAN AMERICAN: >60 ML/MIN
GFR NON-AFRICAN AMERICAN: >60 ML/MIN
GFR SERPL CREATININE-BSD FRML MDRD: NORMAL ML/MIN/{1.73_M2}
GFR SERPL CREATININE-BSD FRML MDRD: NORMAL ML/MIN/{1.73_M2}
GLUCOSE BLD-MCNC: 90 MG/DL (ref 70–99)
HCT VFR BLD CALC: 36.2 % (ref 36–46)
HEMOGLOBIN: 12.5 G/DL (ref 12–16)
IMMATURE GRANULOCYTES: ABNORMAL %
LYMPHOCYTES # BLD: 45 % (ref 24–44)
MCH RBC QN AUTO: 31.6 PG (ref 26–34)
MCHC RBC AUTO-ENTMCNC: 34.5 G/DL (ref 31–37)
MCV RBC AUTO: 91.7 FL (ref 80–100)
MONOCYTES # BLD: 10 % (ref 1–7)
MORPHOLOGY: NORMAL
NRBC AUTOMATED: ABNORMAL PER 100 WBC
PDW BLD-RTO: 13.3 % (ref 11.5–14.9)
PLATELET # BLD: 205 K/UL (ref 150–450)
PLATELET ESTIMATE: ABNORMAL
PMV BLD AUTO: 7.3 FL (ref 6–12)
POTASSIUM SERPL-SCNC: 3.7 MMOL/L (ref 3.7–5.3)
RBC # BLD: 3.94 M/UL (ref 4–5.2)
RBC # BLD: ABNORMAL 10*6/UL
SEG NEUTROPHILS: 43 % (ref 36–66)
SEGMENTED NEUTROPHILS ABSOLUTE COUNT: 2.32 K/UL (ref 1.3–9.1)
SODIUM BLD-SCNC: 139 MMOL/L (ref 135–144)
TROPONIN INTERP: NORMAL
TROPONIN T: NORMAL NG/ML
TROPONIN, HIGH SENSITIVITY: <6 NG/L (ref 0–14)
WBC # BLD: 5.4 K/UL (ref 3.5–11)
WBC # BLD: ABNORMAL 10*3/UL

## 2022-02-08 PROCEDURE — 85025 COMPLETE CBC W/AUTO DIFF WBC: CPT

## 2022-02-08 PROCEDURE — 71045 X-RAY EXAM CHEST 1 VIEW: CPT

## 2022-02-08 PROCEDURE — 99283 EMERGENCY DEPT VISIT LOW MDM: CPT

## 2022-02-08 PROCEDURE — 93005 ELECTROCARDIOGRAM TRACING: CPT | Performed by: STUDENT IN AN ORGANIZED HEALTH CARE EDUCATION/TRAINING PROGRAM

## 2022-02-08 PROCEDURE — 36415 COLL VENOUS BLD VENIPUNCTURE: CPT

## 2022-02-08 PROCEDURE — 84484 ASSAY OF TROPONIN QUANT: CPT

## 2022-02-08 PROCEDURE — 93005 ELECTROCARDIOGRAM TRACING: CPT | Performed by: EMERGENCY MEDICINE

## 2022-02-08 PROCEDURE — 80048 BASIC METABOLIC PNL TOTAL CA: CPT

## 2022-02-08 RX ORDER — ORPHENADRINE CITRATE 30 MG/ML
60 INJECTION INTRAMUSCULAR; INTRAVENOUS ONCE
Status: DISCONTINUED | OUTPATIENT
Start: 2022-02-08 | End: 2022-02-09 | Stop reason: HOSPADM

## 2022-02-08 RX ORDER — IBUPROFEN 600 MG/1
600 TABLET ORAL EVERY 8 HOURS PRN
Qty: 30 TABLET | Refills: 0 | Status: SHIPPED | OUTPATIENT
Start: 2022-02-08 | End: 2022-02-15

## 2022-02-08 RX ORDER — IBUPROFEN 200 MG
400 TABLET ORAL ONCE
Status: DISCONTINUED | OUTPATIENT
Start: 2022-02-08 | End: 2022-02-09 | Stop reason: HOSPADM

## 2022-02-08 ASSESSMENT — ENCOUNTER SYMPTOMS
ABDOMINAL PAIN: 0
COUGH: 0
CONSTIPATION: 0
VOMITING: 0
SHORTNESS OF BREATH: 0
CHEST TIGHTNESS: 1
NAUSEA: 0
TROUBLE SWALLOWING: 0
EYES NEGATIVE: 1
DIARRHEA: 0
APNEA: 0
ABDOMINAL DISTENTION: 0
BACK PAIN: 0

## 2022-02-08 ASSESSMENT — PAIN DESCRIPTION - LOCATION: LOCATION: CHEST

## 2022-02-08 ASSESSMENT — PAIN DESCRIPTION - ORIENTATION: ORIENTATION: LEFT

## 2022-02-08 ASSESSMENT — PAIN DESCRIPTION - DESCRIPTORS: DESCRIPTORS: BURNING

## 2022-02-08 ASSESSMENT — PAIN DESCRIPTION - FREQUENCY: FREQUENCY: CONTINUOUS

## 2022-02-08 ASSESSMENT — PAIN DESCRIPTION - ONSET: ONSET: ON-GOING

## 2022-02-08 ASSESSMENT — PAIN SCALES - GENERAL: PAINLEVEL_OUTOF10: 6

## 2022-02-08 ASSESSMENT — PAIN - FUNCTIONAL ASSESSMENT: PAIN_FUNCTIONAL_ASSESSMENT: PREVENTS OR INTERFERES SOME ACTIVE ACTIVITIES AND ADLS

## 2022-02-08 ASSESSMENT — PAIN DESCRIPTION - PAIN TYPE: TYPE: ACUTE PAIN

## 2022-02-08 ASSESSMENT — PAIN DESCRIPTION - PROGRESSION: CLINICAL_PROGRESSION: GRADUALLY WORSENING

## 2022-02-08 NOTE — ED TRIAGE NOTES
Mode of arrival (squad #, walk in, police, etc) : walk in         Chief complaint(s): CP        Arrival Note (brief scenario, treatment PTA, etc). :patient arrived to ED with C/O left sided CP, described as \"it feels like my entire body is being shocked, and my body is being jolted\". Patient reports onset approx 1 week ago, and has experienced palpitations in the past but it normally subsided (approx 5 years ago). Denies any increased stress. Patient denies any CP, dizziness, HA, fevers, chills, or abd pain. Patient states the pain will no go away it is progressively getting worse         C= \"Have you ever felt that you should Cut down on your drinking? \"  No  A= \"Have people Annoyed you by criticizing your drinking? \"  No  G= \"Have you ever felt bad or Guilty about your drinking? \"  No  E= \"Have you ever had a drink as an Eye-opener first thing in the morning to steady your nerves or to help a hangover? \"  No      Deferred []      Reason for deferring: N/A    *If yes to two or more: probable alcohol abuse. *

## 2022-02-09 ENCOUNTER — HOSPITAL ENCOUNTER (EMERGENCY)
Age: 27
Discharge: HOME | End: 2022-02-09
Attending: EMERGENCY MEDICINE
Payer: COMMERCIAL

## 2022-02-09 ENCOUNTER — NURSE TRIAGE (OUTPATIENT)
Dept: OTHER | Facility: CLINIC | Age: 27
End: 2022-02-09

## 2022-02-09 VITALS
TEMPERATURE: 98.3 F | SYSTOLIC BLOOD PRESSURE: 116 MMHG | HEART RATE: 99 BPM | WEIGHT: 153 LBS | HEIGHT: 65 IN | DIASTOLIC BLOOD PRESSURE: 73 MMHG | RESPIRATION RATE: 18 BRPM | BODY MASS INDEX: 25.49 KG/M2 | OXYGEN SATURATION: 100 %

## 2022-02-09 DIAGNOSIS — R07.89 ATYPICAL CHEST PAIN: Primary | ICD-10-CM

## 2022-02-09 LAB
BILIRUBIN URINE: NEGATIVE
C-REACTIVE PROTEIN: <3 MG/L (ref 0–5)
COLOR: YELLOW
COMMENT UA: NORMAL
D-DIMER QUANTITATIVE: <0.27 MG/L FEU (ref 0–0.59)
EKG ATRIAL RATE: 78 BPM
EKG ATRIAL RATE: 89 BPM
EKG P AXIS: 31 DEGREES
EKG P AXIS: 40 DEGREES
EKG P-R INTERVAL: 118 MS
EKG P-R INTERVAL: 130 MS
EKG Q-T INTERVAL: 364 MS
EKG Q-T INTERVAL: 388 MS
EKG QRS DURATION: 74 MS
EKG QRS DURATION: 90 MS
EKG QTC CALCULATION (BAZETT): 442 MS
EKG QTC CALCULATION (BAZETT): 442 MS
EKG R AXIS: 60 DEGREES
EKG R AXIS: 74 DEGREES
EKG T AXIS: 34 DEGREES
EKG T AXIS: 47 DEGREES
EKG VENTRICULAR RATE: 78 BPM
EKG VENTRICULAR RATE: 89 BPM
GLUCOSE URINE: NEGATIVE
KETONES, URINE: NEGATIVE
LEUKOCYTE ESTERASE, URINE: NEGATIVE
NITRITE, URINE: NEGATIVE
PH UA: 7 (ref 5–8)
PROTEIN UA: NEGATIVE
SARS-COV-2, RAPID: NOT DETECTED
SEDIMENTATION RATE, ERYTHROCYTE: 4 MM (ref 0–20)
SPECIFIC GRAVITY UA: 1 (ref 1–1.03)
SPECIMEN DESCRIPTION: NORMAL
TURBIDITY: CLEAR
URINE HGB: NEGATIVE
UROBILINOGEN, URINE: NORMAL

## 2022-02-09 PROCEDURE — 85652 RBC SED RATE AUTOMATED: CPT

## 2022-02-09 PROCEDURE — 93010 ELECTROCARDIOGRAM REPORT: CPT | Performed by: INTERNAL MEDICINE

## 2022-02-09 PROCEDURE — 86140 C-REACTIVE PROTEIN: CPT

## 2022-02-09 PROCEDURE — 36415 COLL VENOUS BLD VENIPUNCTURE: CPT

## 2022-02-09 PROCEDURE — 6360000002 HC RX W HCPCS: Performed by: STUDENT IN AN ORGANIZED HEALTH CARE EDUCATION/TRAINING PROGRAM

## 2022-02-09 PROCEDURE — 2580000003 HC RX 258: Performed by: STUDENT IN AN ORGANIZED HEALTH CARE EDUCATION/TRAINING PROGRAM

## 2022-02-09 PROCEDURE — 6370000000 HC RX 637 (ALT 250 FOR IP): Performed by: STUDENT IN AN ORGANIZED HEALTH CARE EDUCATION/TRAINING PROGRAM

## 2022-02-09 PROCEDURE — 96372 THER/PROPH/DIAG INJ SC/IM: CPT

## 2022-02-09 PROCEDURE — 85379 FIBRIN DEGRADATION QUANT: CPT

## 2022-02-09 PROCEDURE — 81003 URINALYSIS AUTO W/O SCOPE: CPT

## 2022-02-09 PROCEDURE — 87086 URINE CULTURE/COLONY COUNT: CPT

## 2022-02-09 PROCEDURE — 87635 SARS-COV-2 COVID-19 AMP PRB: CPT

## 2022-02-09 PROCEDURE — 99283 EMERGENCY DEPT VISIT LOW MDM: CPT

## 2022-02-09 RX ORDER — ACETAMINOPHEN 500 MG
1000 TABLET ORAL ONCE
Status: COMPLETED | OUTPATIENT
Start: 2022-02-09 | End: 2022-02-09

## 2022-02-09 RX ORDER — KETOROLAC TROMETHAMINE 30 MG/ML
30 INJECTION, SOLUTION INTRAMUSCULAR; INTRAVENOUS ONCE
Status: COMPLETED | OUTPATIENT
Start: 2022-02-09 | End: 2022-02-09

## 2022-02-09 RX ORDER — 0.9 % SODIUM CHLORIDE 0.9 %
1000 INTRAVENOUS SOLUTION INTRAVENOUS ONCE
Status: COMPLETED | OUTPATIENT
Start: 2022-02-09 | End: 2022-02-09

## 2022-02-09 RX ADMIN — KETOROLAC TROMETHAMINE 30 MG: 30 INJECTION, SOLUTION INTRAMUSCULAR; INTRAVENOUS at 19:01

## 2022-02-09 RX ADMIN — ACETAMINOPHEN 1000 MG: 500 TABLET ORAL at 19:02

## 2022-02-09 RX ADMIN — SODIUM CHLORIDE 1000 ML: 9 INJECTION, SOLUTION INTRAVENOUS at 19:57

## 2022-02-09 ASSESSMENT — ENCOUNTER SYMPTOMS
SINUS PRESSURE: 0
VOMITING: 0
SHORTNESS OF BREATH: 0
EYES NEGATIVE: 1
CHOKING: 0
DIARRHEA: 0
CHEST TIGHTNESS: 1
APNEA: 0
ABDOMINAL DISTENTION: 0
SINUS PAIN: 0
ABDOMINAL PAIN: 0
COUGH: 0
NAUSEA: 0

## 2022-02-09 ASSESSMENT — PAIN SCALES - GENERAL
PAINLEVEL_OUTOF10: 8
PAINLEVEL_OUTOF10: 9
PAINLEVEL_OUTOF10: 9

## 2022-02-09 ASSESSMENT — PAIN DESCRIPTION - PAIN TYPE: TYPE: ACUTE PAIN

## 2022-02-09 ASSESSMENT — PAIN DESCRIPTION - LOCATION: LOCATION: CHEST;BACK

## 2022-02-09 NOTE — ED PROVIDER NOTES
EMERGENCY DEPARTMENT ENCOUNTER   ATTENDING ATTESTATION     Pt Name: Latrice Lewis  MRN: [id number]  [place] 1995  Date of evaluation: 2/9/22       Latrice Lewis is a 27 y.o. female who presents with Chest Pain and Back Pain      MDM: 62-year-old patient presents for complaint of chest pain, patient was seen yesterday for same complaint work-up yesterday was unremarkable, on initial exam patient in no acute distress vitals are stable, will recheck labs, chest x-ray from yesterday was reviewed and was unremarkable, no further imaging needed at this time    Labs reviewed and unremarkable, results were discussed with patient, discussed need for follow-up with PCP and return precautions, patient voiced understanding is comfortable plan and discharge home    Patient/Guardian was informed of their diagnosis and told to follow up with PCP in 1-3 days. Patient demonstrates understanding and agreement with the plan. They were given the opportunity to ask questions and those questions were answered to the best of our ability with the available information. Patient/Guardian told to return to the ED for any new, worsening, changing or persistent symptoms. This dictation was prepared using "[employer] voice recognition software. Vitals:   Vitals:    02/09/22 1839   BP: 116/73   Pulse: 99   Resp: 18   Temp: 98.3 °F (36.8 °C)   TempSrc: Oral   SpO2: 100%   Weight: 153 lb (69.4 kg)   Height: 5' 5\" (1.651 m)         I personally saw and examined the patient. I have reviewed and agree with the resident's findings, including all diagnostic interpretations and treatment plan as written. I was present for the key portions of any procedures performed and the inclusive time noted for any critical care statement. The care is provided during an unprecedented national emergency due to the novel coronavirus, COVID 19.   Gisela Sunshine DO  Attending Emergency Physician          Gisela Sunshine DO  02/10/22 0378

## 2022-02-09 NOTE — ED NOTES
Pt states he has been having chest pain for one week. He describes it as pressure and intermittent \"jolts\" that go through his whole body. Pt has had COVID twice and does feel short of breath sometimes.       Ying Avalos RN  02/08/22 1946

## 2022-02-09 NOTE — ED PROVIDER NOTES
[address]  Emergency Department Encounter  Emergency Medicine Resident     Pt Name: Myra Healy  SOC:[id number]  [place] 1995  Date of evaluation: 2/8/22  PCP:  Fabiola Carey MD    CHIEF COMPLAINT       Chief Complaint   Patient presents with    Chest Pain       HISTORY OF PRESENT ILLNESS  (Location/Symptom, Timing/Onset, Context/Setting, Quality, Duration, ModifyingFactors, Severity.)      Myra Healy is a 27 y.o. female presents to the emergency department for evaluation of chest pain and discomfort. Patient states that the pain has been ongoing for approximately 1 week, states that it is worse with pressure, does not increase with deep breathing, states there is no associated cough. Patient does endorse that he did have the original Covid virus and has had some myalgias ever since. Patient does not endorse any diaphoresis, left or right arm involvement, nausea or vomiting. There is mild tenderness the patient states if he lays plate back and mildly relieved with leaning forward. PAST MEDICAL / SURGICAL / SOCIAL /FAMILY HISTORY      has a past medical history of Bipolar disorder without psychotic features ([address] Depression, and Headache. No other pertinent PMH on review with patient/guardian. has a past surgical history that includes Colonoscopy (N/A, 11/4/2019). No other pertinent PSH on review with patient/guardian.   Social History     Socioeconomic History    Marital status: Single     Spouse name: Not on file    Number of children: Not on file    Years of education: Not on file    Highest education level: Not on file   Occupational History    Not on file   Tobacco Use    Smoking status: Never Smoker    Smokeless tobacco: Never Used   Vaping Use    Vaping Use: Never used   Substance and Sexual Activity    Alcohol use: Yes     Comment: socially     Drug use: Yes     Frequency: 1.0 times per week     Types: Marijuana Rajiv Don)     Comment: weekly    Sexual activity: Not on file   Other Topics Concern    Not on file   Social History Narrative    Not on file     Social Determinants of Health     Financial Resource Strain:     Difficulty of Paying Living Expenses: Not on file   Food Insecurity:     Worried About Running Out of Food in the Last Year: Not on file    Roger of Food in the Last Year: Not on file   Transportation Needs:     Lack of Transportation (Medical): Not on file    Lack of Transportation (Non-Medical): Not on file   Physical Activity:     Days of Exercise per Week: Not on file    Minutes of Exercise per Session: Not on file   Stress:     Feeling of Stress : Not on file   Social Connections:     Frequency of Communication with Friends and Family: Not on file    Frequency of Social Gatherings with Friends and Family: Not on file    Attends Mandaen Services: Not on file    Active Member of [address] [place] [address] [employer] or Organizations: Not on file    Attends Club or Organization Meetings: Not on file    Marital Status: Not on file   Intimate Partner Violence:     Fear of Current or Ex-Partner: Not on file    Emotionally Abused: Not on file    Physically Abused: Not on file    Sexually Abused: Not on file   Housing Stability:     Unable to Pay for Housing in the Last Year: Not on file    Number of [place] in the Last Year: Not on file    Unstable Housing in the Last Year: Not on file       I counseled the patient against using tobacco products. Family History   Problem Relation Age of Onset    No Known Problems Mother     Heart Disease Paternal Grandfather      No other pertinent FamHx on review with patient/guardian. Allergies:  Pcn [penicillins], Beef-derived products, and Pork-derived products    Home Medications:  Prior to Admission medications    Medication Sig Start Date End Date Taking?  Authorizing Provider   ibuprofen (ADVIL;MOTRIN) 600 MG tablet Take 1 tablet by mouth every 8 hours as needed for Pain 2/8/22  Yes Sadie Choudhury MD Stephanie   lamoTRIgine (LAMICTAL) 150 MG tablet Take 1 tablet by mouth daily 10/15/20   Giulia Gonzalez MD   ARIPiprazole (ABILIFY) 15 MG tablet Take 1 tablet by mouth daily 10/15/20   Giulia Gonzalez MD   testosterone cypionate (DEPOTESTOTERONE CYPIONATE) 200 MG/ML injection Inject 60 mg into the muscle. 0.3 mL = 60 mg 9/19/19   Historical Provider, MD       REVIEW OF SYSTEMS    (2-9 systems for level 4, 10 ormore for level 5)      Review of Systems   Constitutional: Negative for activity change, appetite change and fatigue. HENT: Negative for congestion, tinnitus and trouble swallowing. Eyes: Negative. Respiratory: Positive for chest tightness. Negative for apnea, cough and shortness of breath. Cardiovascular: Positive for chest pain. Negative for palpitations and leg swelling. Gastrointestinal: Negative for abdominal distention, abdominal pain, constipation, diarrhea, nausea and vomiting. Genitourinary: Negative. Musculoskeletal: Negative for arthralgias, back pain and myalgias. Skin: Negative. Neurological: Negative. Psychiatric/Behavioral: Negative. PHYSICAL EXAM   (up to 7 for level 4, 8 or more for level 5)      INITIAL VITALS:   BP (!) 122/90   Pulse 94   Temp 98.3 °F (36.8 °C) (Oral)   Resp 18   Ht 5' 5\" (1.651 m)   Wt 150 lb (68 kg)   LMP 02/08/2022   SpO2 98%   BMI 24.96 kg/m²     Physical Exam  Vitals reviewed. Constitutional:       Appearance: Normal appearance. HENT:      Head: Normocephalic and atraumatic. Nose: Nose normal.      Mouth/Throat:      Mouth: Mucous membranes are moist.      Pharynx: Oropharynx is clear. Eyes:      Extraocular Movements: Extraocular movements intact. Conjunctiva/sclera: Conjunctivae normal.      Pupils: Pupils are equal, round, and reactive to light. Cardiovascular:      Rate and Rhythm: Normal rate and regular rhythm. Pulses: Normal pulses. Heart sounds: Murmur heard.        Pulmonary:      Effort: Pulmonary effort is normal.      Breath sounds: Normal breath sounds. Abdominal:      General: Abdomen is flat. Palpations: Abdomen is soft. Musculoskeletal:         General: Normal range of motion. Cervical back: Normal range of motion and neck supple. Skin:     General: Skin is warm and dry. Capillary Refill: Capillary refill takes less than 2 seconds. Neurological:      General: No focal deficit present. Mental Status: She is alert. Mental status is at baseline.    Psychiatric:         Mood and Affect: Mood normal.         DIFFERENTIAL  DIAGNOSIS     DDX: Costochondritis, pneumonia, ACS, musculoskeletal chest pain    PLAN (LABS / IMAGING / EKG):  Orders Placed This Encounter   Procedures    XR CHEST PORTABLE    CBC Auto Differential    Basic Metabolic Panel w/ Reflex to MG    Troponin    EKG 12 Lead       MEDICATIONS ORDERED:  Orders Placed This Encounter   Medications    orphenadrine (NORFLEX) injection 60 mg    ibuprofen (ADVIL;MOTRIN) tablet 400 mg    ibuprofen (ADVIL;MOTRIN) 600 MG tablet     Sig: Take 1 tablet by mouth every 8 hours as needed for Pain     Dispense:  30 tablet     Refill:  0           DIAGNOSTIC RESULTS / EMERGENCY DEPARTMENT COURSE / MDM     LABS:  Results for orders placed or performed during the hospital encounter of 02/08/22   CBC Auto Differential   Result Value Ref Range    WBC 5.4 3.5 - 11.0 k/uL    RBC 3.94 (L) 4.0 - 5.2 m/uL    Hemoglobin 12.5 12.0 - 16.0 g/dL    Hematocrit 36.2 36 - 46 %    MCV 91.7 80 - 100 fL    MCH 31.6 26 - 34 pg    MCHC 34.5 31 - 37 g/dL    RDW 13.3 11.5 - 14.9 %    Platelets 136 739 - 024 k/uL    MPV 7.3 6.0 - 12.0 fL    NRBC Automated NOT REPORTED per 100 WBC    Differential Type NOT REPORTED     Immature Granulocytes NOT REPORTED 0 %    Absolute Immature Granulocyte NOT REPORTED 0.00 - 0.30 k/uL    WBC Morphology NOT REPORTED     RBC Morphology NOT REPORTED     Platelet Estimate NOT REPORTED     Seg Neutrophils 43 36 - 66 % Lymphocytes 45 (H) 24 - 44 %    Monocytes 10 (H) 1 - 7 %    Eosinophils % 0 0 - 4 %    Basophils 2 0 - 2 %    Segs Absolute 2.32 1.3 - 9.1 k/uL    Absolute Lymph # 2.43 1.0 - 4.8 k/uL    Absolute Mono # 0.54 0.1 - 1.3 k/uL    Absolute Eos # 0.00 0.0 - 0.4 k/uL    Basophils Absolute 0.11 0.0 - 0.2 k/uL    Morphology Normal    Basic Metabolic Panel w/ Reflex to MG   Result Value Ref Range    Glucose 90 70 - 99 mg/dL    BUN 15 6 - 20 mg/dL    CREATININE 0.81 0.50 - 0.90 mg/dL    Bun/Cre Ratio NOT REPORTED 9 - 20    Calcium 9.7 8.6 - 10.4 mg/dL    Sodium 139 135 - 144 mmol/L    Potassium 3.7 3.7 - 5.3 mmol/L    Chloride 102 98 - 107 mmol/L    CO2 26 20 - 31 mmol/L    Anion Gap 11 9 - 17 mmol/L    GFR Non-African American >60 >60 mL/min    GFR African American >60 >60 mL/min    GFR Comment          GFR Staging NOT REPORTED    Troponin   Result Value Ref Range    Troponin, High Sensitivity <6 0 - 14 ng/L    Troponin T NOT REPORTED <0.03 ng/mL    Troponin Interp NOT REPORTED    EKG 12 Lead   Result Value Ref Range    Ventricular Rate 78 BPM    Atrial Rate 78 BPM    P-R Interval 130 ms    QRS Duration 90 ms    Q-T Interval 388 ms    QTc Calculation (Bazett) 442 ms    P Axis 40 degrees    R Axis 74 degrees    T Axis 47 degrees         IMPRESSION/MDM/ED COURSE:  27 y.o. female presented with intermittent and ongoing chest pain for approximately 1 week, low suspicion at this time for ACS given negative previous work-ups and reassuring EKG. Concern for possible costochondritis versus musculoskeletal chest pain given the area of concern of the upper outer chest which is mildly increased in pain with palpation. Will get cardiac evaluation and work-up and obtain a chest x-ray. ED Course as of 02/08/22 2219 Tue Feb 08, 2022 2218 Work-up is negative for any acute findings. Will give symptomatic relief with Norflex and Motrin and discharge the patient.  [ES]      ED Course User Index  [ES] Rome Hargrove MD Patient/Guardian requesting discharge. Patient/Guardian was given written and verbal instructions prior to discharge. Patient/Guardian understood and agreed. Patient/Guardian had no further questions. RADIOLOGY:  XR CHEST PORTABLE   Final Result   No acute process. EKG  EKG Interpretation    Interpreted by me    Rhythm: normal sinus   Rate: normal  Axis: normal  Ectopy: none  Conduction: normal  ST Segments: no acute change  T Waves: no acute change  Q Waves: none    Clinical Impression: no acute changes and normal EKG    All EKG's are interpreted by the Emergency Department Physician who either signs or Co-signs this chart in the absence of a cardiologist.      PROCEDURES:  None  CONSULTS:  None        FINAL IMPRESSION      1.  Chest pain, unspecified type          DISPOSITION / PLAN       DISPOSITION Decision To Discharge 02/08/2022 10:01:02 PM        PATIENT REFERREDTO:  Paul Rodríguez MD  [address] [facility] [address]  [address] [facility] [address]  [facility] [address]  [phone]          DISCHARGE MEDICATIONS:  New Prescriptions    IBUPROFEN (ADVIL;MOTRIN) 600 MG TABLET    Take 1 tablet by mouth every 8 hours as needed for Pain       Aquiles Díaz MD  PGY 2  Resident Physician Emergency Medicine  02/08/22 10:19 PM        (Please note that portions of this note were completed with a voice recognition program.Efforts were made to edit the dictations but occasionally words are mis-transcribed.)        Aquiles Díaz MD  Resident  02/08/22 4411

## 2022-02-09 NOTE — LETTER
[facility] ED  [address] [facility] [address]  Phone: [phone]             February 9, 2022    Patient: Damian De León   YOB: 1995   Date of Visit: 2/9/2022       To Whom It May Concern:    Kamlesh Kimble was seen and treated in our emergency department on 2/9/2022. May return to work on 02/11/2021 .       Sincerely,             Signature:__________________________________

## 2022-02-09 NOTE — ED PROVIDER NOTES
[address] ED  eMERGENCY dEPARTMENT eNCOUnter   Attending Attestation     Pt Name: Damian De León  MRN: [id number]  [place] 1995  Date of evaluation: 2/8/22    History, EXAM, MDM:    Damian De León is a 27 y.o. female who presents with Chest Pain    CP 1 wk. Painful to touch. Upper outer right, radiaties across chest.   VSS. EKG shows a sinus rhythm. HR is 78, , QRS 90, , no OLIVIA, No STD, No TWI, the axis is normal.  No sign of pericarditis. CXR shows no lung mass, pneumothorax. No troponin elevation. No anemia. No uremia. PERC negative doubt PE. Likely muscular pain. Provided symptoamtic treatment. D/w pt the results, treatment plan, warning precautions for prompt ED return and importance of close OP FU, the patient verbalizes understanding and agrees with the treatment plan. Vitals:   Vitals:    02/08/22 1835 02/08/22 1944   BP: 117/77 (!) 122/90   Pulse: 87 94   Resp: 22 18   Temp: 98.4 °F (36.9 °C) 98.3 °F (36.8 °C)   TempSrc: Oral Oral   SpO2: 100% 98%   Weight: 150 lb (68 kg)    Height: 5' 5\" (1.651 m)      I performed a history and physical examination of the patient and discussed management with the resident. I reviewed the residents note and agree with the documented findings and plan of care. Any areas of disagreement are noted on the chart. I was personally present for the key portions of any procedures. I have documented in the chart those procedures where I was not present during the key portions. I have personally reviewed all images and agree with the resident's interpretation. I have reviewed the emergency nurses triage note. I agree with the chief complaint, past medical history, past surgical history, allergies, medications, social and family history as documented unless otherwise noted below.  Documentation of the HPI, Physical Exam and Medical Decision Making performed by medical students or scribes is based on my personal performance of the HPI, PE and MDM. For Phys Assistant/ Nurse Practitioner cases/documentation I have had a face to face evaluation of this patient and have completed at least one if not all key elements of the E/M (history, physical exam, and MDM). Additional findings are as noted.     Leopoldo Ken, MD  Attending Emergency  Physician               Leopoldo Ken, MD  02/08/22 9939       Leopoldo Ken, MD  02/09/22 7867

## 2022-02-09 NOTE — Clinical Note
Agustina Hsieh was seen and treated in our emergency department on 2/9/2022. She may return to work on 02/11/2022. If you have any questions or concerns, please don't hesitate to call.       Viviane Riley MD

## 2022-02-09 NOTE — ED TRIAGE NOTES
Mode of arrival (squad #, walk in, police, etc) : walk in      Chief complaint(s): chest pain back pain, lightheaded upon standing to long        Arrival Note (brief scenario, treatment PTA, etc). : pt seen here last night for chest pain and told to return in not getting any better or worse. Now chest pain the same but having back pain in spine and lightheaded upon standing along time. Also shaky and really tired. C= \"Have you ever felt that you should Cut down on your drinking? \"no  A= \"Have people Annoyed you by criticizing your drinking? \" no  G= \"Have you ever felt bad or Guilty about your drinking? \" no  E= \"Have you ever had a drink as an Eye-opener first thing in the morning to steady your nerves or to help a hangover? \" no     Deferred []      Reason for deferring: na    *If yes to two or more: probable alcohol abuse. *

## 2022-02-09 NOTE — TELEPHONE ENCOUNTER
[facility] [employer] [facility]. Subjective: ED yesterday with CP. Has worsened since yesterday. Was told everything looked good. Today is a little tighter around where heart. Hx of heart murmer. HR was in good range at MD appt today (karen FRANK). Current Symptoms: Persistent tightness around heart. Had been coming and going but now is constant. Hard to breath in deeply or when walking for a long time. Onset: 1 1/2 weeks ago; worsening    Associated Symptoms: NA    Pain Severity: 8/10; tightness; constant    Temperature: no fever by unknown method    What has been tried: nothing yet    LMP: currently on period Pregnant: No    Recommended disposition: go to ED now. Care advice provided, patient verbalizes understanding; denies any other questions or concerns; instructed to call back for any new or worsening symptoms. Patient/caller proceeding to nearest Emergency Department     Attention Provider: Thank you for allowing me to participate in the care of your patient. The patient was connected to triage in response to information provided to the ECC/PSC. Please do not respond through this encounter as the response is not directed to a shared pool. Reason for Disposition   SEVERE chest pain     Feels like being electrocuted or like \"someone is pulling me back\", when I stand up for long periods.     Protocols used: CHEST PAIN-ADULT-[place]

## 2022-02-10 LAB
CULTURE: NORMAL
Lab: NORMAL
SPECIMEN DESCRIPTION: NORMAL

## 2022-02-10 NOTE — ED PROVIDER NOTES
[address]  Emergency Department Encounter  Emergency Medicine Resident     Pt Name: Bossman Rodriguez  LNZ:[id number]  [place] 1995  Date of evaluation: 2/9/22  PCP:  Kanika Jacob MD    CHIEF COMPLAINT       Chief Complaint   Patient presents with    Chest Pain    Back Pain       HISTORY OF PRESENT ILLNESS  (Location/Symptom, Timing/Onset, Context/Setting, Quality, Duration, ModifyingFactors, Severity.)      Bossman Rodriguez is a 27 y.o. female presents to the emergency department for reevaluation. Patient was seen last evening on 8 February 2022 for same symptoms however patient states that chest pain is continued and now there is new onset back pain and headache. Patient denies the headache is the worst of his life, denies photophobia, changes in vision, recent instrumentation of his back, IV drug use or recent infections. Stated the back pain feels musculoskeletal in nature and it is in his upper back. Patient is concerned that his symptoms are worsening which is why he returned to the emergency department for evaluation. PAST MEDICAL / SURGICAL / SOCIAL /FAMILY HISTORY      has a past medical history of Bipolar disorder without psychotic features ([address] Depression, and Headache. No other pertinent PMH on review with patient/guardian. has a past surgical history that includes Colonoscopy (N/A, 11/4/2019). No other pertinent PSH on review with patient/guardian.   Social History     Socioeconomic History    Marital status: Single     Spouse name: Not on file    Number of children: Not on file    Years of education: Not on file    Highest education level: Not on file   Occupational History    Not on file   Tobacco Use    Smoking status: Never Smoker    Smokeless tobacco: Never Used   Vaping Use    Vaping Use: Never used   Substance and Sexual Activity    Alcohol use: Yes     Comment: socially     Drug use: Yes     Frequency: 1.0 times per week     Types: Marijuana (Sarah Villegas) Comment: weekly    Sexual activity: Not on file   Other Topics Concern    Not on file   Social History Narrative    Not on file     Social Determinants of Health     Financial Resource Strain:     Difficulty of Paying Living Expenses: Not on file   Food Insecurity:     Worried About Running Out of Food in the Last Year: Not on file    Roger of Food in the Last Year: Not on file   Transportation Needs:     Lack of Transportation (Medical): Not on file    Lack of Transportation (Non-Medical): Not on file   Physical Activity:     Days of Exercise per Week: Not on file    Minutes of Exercise per Session: Not on file   Stress:     Feeling of Stress : Not on file   Social Connections:     Frequency of Communication with Friends and Family: Not on file    Frequency of Social Gatherings with Friends and Family: Not on file    Attends Yarsani Services: Not on file    Active Member of [address] [place] [address] Departing or Organizations: Not on file    Attends Club or Organization Meetings: Not on file    Marital Status: Not on file   Intimate Partner Violence:     Fear of Current or Ex-Partner: Not on file    Emotionally Abused: Not on file    Physically Abused: Not on file    Sexually Abused: Not on file   Housing Stability:     Unable to Pay for Housing in the Last Year: Not on file    Number of [place] in the Last Year: Not on file    Unstable Housing in the Last Year: Not on file       I counseled the patient against using tobacco products. Family History   Problem Relation Age of Onset    No Known Problems Mother     Heart Disease Paternal Grandfather      No other pertinent FamHx on review with patient/guardian. Allergies:  Pcn [penicillins], Beef-derived products, and Pork-derived products    Home Medications:  Prior to Admission medications    Medication Sig Start Date End Date Taking?  Authorizing Provider   ibuprofen (ADVIL;MOTRIN) 600 MG tablet Take 1 tablet by mouth every 8 hours as needed for Pain 2/8/22   Birgit Lin MD   lamoTRIgine (LAMICTAL) 150 MG tablet Take 1 tablet by mouth daily 10/15/20   Marge Vazquez MD   ARIPiprazole (ABILIFY) 15 MG tablet Take 1 tablet by mouth daily 10/15/20   Marge Vazquez MD   testosterone cypionate (DEPOTESTOTERONE CYPIONATE) 200 MG/ML injection Inject 60 mg into the muscle. 0.3 mL = 60 mg 9/19/19   Historical Provider, MD       REVIEW OF SYSTEMS    (2-9 systems for level 4, 10 ormore for level 5)      Review of Systems   Constitutional: Negative for activity change, appetite change and fatigue. HENT: Negative for congestion, dental problem, sinus pressure, sinus pain and tinnitus. Eyes: Negative. Respiratory: Positive for chest tightness. Negative for apnea, cough, choking and shortness of breath. Cardiovascular: Positive for chest pain. Negative for palpitations and leg swelling. Gastrointestinal: Negative for abdominal distention, abdominal pain, diarrhea, nausea and vomiting. Genitourinary: Positive for frequency. Negative for difficulty urinating and urgency. Musculoskeletal: Negative. Skin: Negative. Neurological: Positive for headaches. Psychiatric/Behavioral: Negative. PHYSICAL EXAM   (up to 7 for level 4, 8 or more for level 5)      INITIAL VITALS:   /73   Pulse 99   Temp 98.3 °F (36.8 °C) (Oral)   Resp 18   Ht 5' 5\" (1.651 m)   Wt 153 lb (69.4 kg)   LMP 02/08/2022   SpO2 100%   BMI 25.46 kg/m²     Physical Exam  Constitutional:       Appearance: Normal appearance. HENT:      Head: Normocephalic and atraumatic. Nose: Nose normal.      Mouth/Throat:      Mouth: Mucous membranes are moist.      Pharynx: Oropharynx is clear. Eyes:      Extraocular Movements: Extraocular movements intact. Conjunctiva/sclera: Conjunctivae normal.      Pupils: Pupils are equal, round, and reactive to light. Cardiovascular:      Rate and Rhythm: Regular rhythm. Tachycardia present. Pulses: Normal pulses. Heart sounds: Normal heart sounds. Pulmonary:      Effort: Pulmonary effort is normal.      Breath sounds: Normal breath sounds. Abdominal:      General: Abdomen is flat. Palpations: Abdomen is soft. Musculoskeletal:         General: Tenderness present. Normal range of motion. Cervical back: Normal range of motion and neck supple. Skin:     General: Skin is warm and dry. Capillary Refill: Capillary refill takes less than 2 seconds. Neurological:      General: No focal deficit present. Mental Status: She is alert. Mental status is at baseline. Psychiatric:         Mood and Affect: Mood normal.         DIFFERENTIAL  DIAGNOSIS     DDX: COVID-19, long-haul Covid syndrome, musculoskeletal chest pain  PLAN (LABS / IMAGING / EKG):  Orders Placed This Encounter   Procedures    COVID-19, Rapid    Culture, Urine    Urinalysis, reflex to microscopic    Sedimentation Rate    C-Reactive Protein    D-Dimer, Quantitative       MEDICATIONS ORDERED:  Orders Placed This Encounter   Medications    ketorolac (TORADOL) injection 30 mg    acetaminophen (TYLENOL) tablet 1,000 mg    0.9 % sodium chloride bolus           DIAGNOSTIC RESULTS / EMERGENCY DEPARTMENT COURSE / MDM     LABS:  Results for orders placed or performed during the hospital encounter of 02/09/22   COVID-19, Rapid    Specimen: Nasopharyngeal Swab   Result Value Ref Range    Specimen Description . NASOPHARYNGEAL SWAB     SARS-CoV-2, Rapid Not Detected Not Detected   Urinalysis, reflex to microscopic   Result Value Ref Range    Color, UA Yellow Yellow    Turbidity UA Clear Clear    Glucose, Ur NEGATIVE NEGATIVE    Bilirubin Urine NEGATIVE NEGATIVE    Ketones, Urine NEGATIVE NEGATIVE    Specific Gravity, UA 1.004 1.000 - 1.030    Urine Hgb NEGATIVE NEGATIVE    pH, UA 7.0 5.0 - 8.0    Protein, UA NEGATIVE NEGATIVE    Urobilinogen, Urine Normal Normal    Nitrite, Urine NEGATIVE NEGATIVE    Leukocyte Esterase, Urine NEGATIVE NEGATIVE Urinalysis Comments       Microscopic exam not performed based on chemical results unless requested in original order. Sedimentation Rate   Result Value Ref Range    Sed Rate 4 0 - 20 mm   C-Reactive Protein   Result Value Ref Range    CRP <3.0 0.0 - 5.0 mg/L   D-Dimer, Quantitative   Result Value Ref Range    D-Dimer, Quant <0.27 0.00 - 0.59 mg/L FEU         IMPRESSION/MDM/ED COURSE:  27 y.o. female presented with nonimproving symptoms of chest pain and mild discomfort with new onset back pain and headache. Will broaden work-up to get ESR, CRP, D-dimer, urinalysis and urine culture. In addition we will get rapid COVID-19 for evaluation. Work-up negative for any acute findings and can explain patient's symptoms. Recommended following up with PCP tomorrow. Dietary habit changes and return to the emergency department for worsening symptoms. Patient/Guardian requesting discharge. Patient/Guardian was given written and verbal instructions prior to discharge. Patient/Guardian understood and agreed. Patient/Guardian had no further questions. RADIOLOGY:  No orders to display         EKG  None    All EKG's are interpreted by the Emergency Department Physician who either signs or Co-signs this chart in the absence of a cardiologist.      PROCEDURES:  None    CONSULTS:  None        FINAL IMPRESSION      1.  Atypical chest pain          DISPOSITION / PLAN       DISPOSITION Decision To Discharge 02/09/2022 08:14:32 PM        PATIENT REFERREDTO:  Brent Andersen MD  [address] [facility]  [address] [facility],[address]  [address] [facility] [address] [phone]    Call       [facility] ED  [address]  [address] [facility]  [phone]    As needed, If symptoms worsen      DISCHARGE MEDICATIONS:  New Prescriptions    No medications on file       Christine Donnelly MD  PGY 2  Resident Physician Emergency Medicine  02/09/22 8:22 PM        (Please note that portions of this note were completed with a voice recognition program.Efforts were made to edit the dictations but occasionally words are mis-transcribed.)        China Mendez MD  Resident  02/09/22 2031

## 2022-02-17 ENCOUNTER — OFFICE VISIT (OUTPATIENT)
Dept: NEUROLOGY | Age: 27
End: 2022-02-17
Payer: COMMERCIAL

## 2022-02-17 VITALS
DIASTOLIC BLOOD PRESSURE: 76 MMHG | WEIGHT: 149 LBS | HEART RATE: 97 BPM | BODY MASS INDEX: 24.83 KG/M2 | SYSTOLIC BLOOD PRESSURE: 107 MMHG | HEIGHT: 65 IN

## 2022-02-17 DIAGNOSIS — G89.29 COVID-19 LONG HAULER MANIFESTING CHRONIC HEADACHE: Primary | ICD-10-CM

## 2022-02-17 DIAGNOSIS — U09.9 COVID-19 LONG HAULER MANIFESTING CHRONIC HEADACHE: Primary | ICD-10-CM

## 2022-02-17 DIAGNOSIS — R51.9 COVID-19 LONG HAULER MANIFESTING CHRONIC HEADACHE: Primary | ICD-10-CM

## 2022-02-17 PROCEDURE — 99204 OFFICE O/P NEW MOD 45 MIN: CPT | Performed by: STUDENT IN AN ORGANIZED HEALTH CARE EDUCATION/TRAINING PROGRAM

## 2022-02-17 RX ORDER — AMITRIPTYLINE HYDROCHLORIDE 25 MG/1
25 TABLET, FILM COATED ORAL NIGHTLY
Qty: 90 TABLET | Refills: 1 | Status: SHIPPED | OUTPATIENT
Start: 2022-02-17 | End: 2022-08-29

## 2022-02-17 RX ORDER — METHYLPREDNISOLONE 4 MG/1
TABLET ORAL
Qty: 1 KIT | Refills: 0 | Status: SHIPPED | OUTPATIENT
Start: 2022-02-17 | End: 2022-02-23

## 2022-02-17 NOTE — PROGRESS NOTES
[facility] NEUROLOGY SPECIALIST  [address]  Dept: [phone]    PATIENT NAME: Hernan Pimentel  PATIENT MRN: [id number]  PRIMARY CARE PHYSICIAN: WALI Arevalo CNP    HPI:      Hernan Pimentel is a 27 y.o. adult who presents to clinic today for evaluation of Headache     Headaches started when he was 15years old. They started occurring again at one week ago. They are located occipitally and in the frontal region. It is described as a deep pressure that is constant. Headaches varies in intesity but rated 7/10 on average. He has been taking motrin 600 mg which has not been effective. Associated symptoms include  photophobia. Denies any other symptoms. No known headache triggers. He had Covid in December 2021.he notes two weeks ago he had chest pain that is described palpitations or heart skipping a beat. He he notes he does have shortness of breath with these episodes. He has an appointment scheduled to see a cardiologist.     History of:  Family history of headaches or migraines: no  Renal stones: no   Head/Neck Trauma: no  Sleep: poor sleep, issues with sleep initiation or excessive sleeping.                 Headache Medications  Current abortive meds:motrin  Current prophylactic meds:none  Previous abortive medications tried:none  Previous prophylactic medications tried:none                                                       PREVIOUS WORKUP:     Lab Results   Component Value Date    WBC 5.4 02/08/2022    HGB 12.5 02/08/2022    HCT 36.2 02/08/2022    MCV 91.7 02/08/2022     02/08/2022       Past Medical History:   Diagnosis Date    Bipolar disorder without psychotic features ([address]     Depression     Headache         Past Surgical History:   Procedure Laterality Date    COLONOSCOPY N/A 11/4/2019    COLONOSCOPY WITH BIOPSY performed by Yonas Bowens MD at [facility] Marital status: Single Spouse name: Not on file    Number of children: Not on file    Years of education: Not on file    Highest education level: Not on file   Occupational History    Not on file   Tobacco Use    Smoking status: Never Smoker    Smokeless tobacco: Never Used   Vaping Use    Vaping Use: Never used   Substance and Sexual Activity    Alcohol use: Yes     Comment: socially     Drug use: Yes     Frequency: 1.0 times per week     Types: Marijuana Gelene Rosa Iselasten)     Comment: weekly    Sexual activity: Not on file   Other Topics Concern    Not on file   Social History Narrative    Not on file     Social Determinants of Health     Financial Resource Strain: Low Risk     Difficulty of Paying Living Expenses: Not hard at all   Food Insecurity: No Food Insecurity    Worried About Running Out of Food in the Last Year: Never true    Roger of Food in the Last Year: Never true   Transportation Needs:     Lack of Transportation (Medical): Not on file    Lack of Transportation (Non-Medical):  Not on file   Physical Activity:     Days of Exercise per Week: Not on file    Minutes of Exercise per Session: Not on file   Stress:     Feeling of Stress : Not on file   Social Connections:     Frequency of Communication with Friends and Family: Not on file    Frequency of Social Gatherings with Friends and Family: Not on file    Attends Congregational Services: Not on file    Active Member of [address] [place] [address] [employer] or Organizations: Not on file    Attends Club or Organization Meetings: Not on file    Marital Status: Not on file   Intimate Partner Violence:     Fear of Current or Ex-Partner: Not on file    Emotionally Abused: Not on file    Physically Abused: Not on file    Sexually Abused: Not on file   Housing Stability:     Unable to Pay for Housing in the Last Year: Not on file    Number of [place] in the Last Year: Not on file    Unstable Housing in the Last Year: Not on file        Current Outpatient Medications   Medication Sig Dispense Refill    amitriptyline (ELAVIL) 25 MG tablet Take 1 tablet by mouth nightly 90 tablet 1    methylPREDNISolone (MEDROL DOSEPACK) 4 MG tablet Take by mouth. 1 kit 0    buPROPion (WELLBUTRIN XL) 300 MG extended release tablet Take 300 mg by mouth every morning      atomoxetine (STRATTERA) 40 MG capsule Take 80 mg by mouth daily      ibuprofen (ADVIL;MOTRIN) 800 MG tablet Take 1 tablet by mouth every 8 hours as needed for Pain 30 tablet 0    tiZANidine (ZANAFLEX) 2 MG tablet Take 1 tablet by mouth 3 times daily as needed (back pain) 30 tablet 0    testosterone cypionate (DEPOTESTOTERONE CYPIONATE) 200 MG/ML injection Inject 60 mg into the muscle once a week. 0.3 mL = 60 mg  0    lamoTRIgine (LAMICTAL) 150 MG tablet Take 1 tablet by mouth daily 30 tablet 0    ARIPiprazole (ABILIFY) 15 MG tablet Take 1 tablet by mouth daily 30 tablet 0     No current facility-administered medications for this visit. Allergies   Allergen Reactions    Pcn [Penicillins] Anaphylaxis and Hives    Beef-Derived Products     Pork-Derived Products         REVIEW OF SYSTEMS:     Review of Systems   Neurological: Positive for light-headedness and headaches.         VITALS  /76 (Site: Left Upper Arm, Position: Sitting, Cuff Size: Medium Adult)   Pulse 97   Ht 5' 5\" (1.651 m)   Wt 149 lb (67.6 kg)   LMP 02/08/2022   BMI 24.79 kg/m²          NEUROLOGICAL EXAMINATION:         Mental status    Alert and oriented x 3; intact memory with no confusion, speech or language problems; no hallucinations or delusions  Fund of information appropriate for level of education    Cranial nerves    II - visual fields intact to confrontation  III, IV, VI - extra-ocular muscles full: no pupillary defect; no MICHELLE, no nystagmus, no ptosis   V - normal facial sensation                                                               VII - normal facial symmetry                                                             VIII - intact hearing                                                                             IX, X - symmetrical palate                                                                  XI - symmetrical shoulder shrug                                                       XII - tongue midline without atrophy or fasciculation      Motor function  Normal muscle bulk and tone; strength 5/5 on all 4 extremities, no pronator drift      Sensory function Intact to light touch, pinprick, vibration, proprioception on all 4 extremities      Cerebellar Intact fine motor movement. No involuntary movements or tremors. No ataxia or dysmetria on finger to nose or heel to shin testing      Reflex function DTR 2+ on bilateral UE and LE, symmetric. Negative Babinski      Gait                   normal base and arm swing        ASSESSMENT / PLAN:   This is a 27 yr old who presents for evaluation of headaches. Patients symptoms are consistent with migraine without aura. Will prescribe amitriptyline for migraine prophylaxis and a medrol dose pack to abort current headache. Will avoid triptans until he is evaluated for palpitations. 1. 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 Deisy Stiles received counseling on the following healthy behaviors: medical compliance, smoking cessation, blood pressure control, regular follow up with primary doctor.         Electronically signed by Hilaria Mcguire MD on 2/20/2022 at 9:08 AM

## 2022-02-17 NOTE — LETTER
[facility] Neurology Specialist  [address] [place] [address]  Phone: [phone]  Fax: [phone]    Anson Reyes MD    February 20, 2022     WALI Vincent - [facility]  [address] [employer] [address] [place] [address]    Patient: Lisa Briones   MR Number: [id number]   YOB: 1995   Date of Visit: 2/17/2022       Dear Lis Black: Thank you for referring Bravo Olivares to me for evaluation/treatment. Below are the relevant portions of my assessment and plan of care. If you have questions, please do not hesitate to call me. I look forward to following Ngozi Cabral along with you.     Sincerely,      Anson Reyes MD

## 2022-02-17 NOTE — LETTER
[facility] Neurology Specialist  [address] [place] [address]  Phone: [phone]  Fax: [phone]           Jerald Carreno MD      February 20, 2022     Patient: Slick Graham   MR Number: [id number]   YOB: 1995   Date of Visit: 2/17/2022       Dear Dr. Swati Bethea: Thank you for referring Shaquille Waterman to me for evaluation/treatment. Below are the relevant portions of my assessment and plan of care. If you have questions, please do not hesitate to call me. I look forward to following Belem Ricketts along with you.     Sincerely,        Jerald Carreno MD    CC providers:  WALI Dick - CNP  [place] [address]  [address]

## 2022-09-05 ENCOUNTER — HOSPITAL ENCOUNTER (EMERGENCY)
Age: 27
Discharge: HOME | End: 2022-09-05
Attending: SPECIALIST
Payer: COMMERCIAL

## 2022-09-05 VITALS
HEART RATE: 96 BPM | OXYGEN SATURATION: 100 % | TEMPERATURE: 99 F | HEIGHT: 65 IN | SYSTOLIC BLOOD PRESSURE: 125 MMHG | RESPIRATION RATE: 14 BRPM | BODY MASS INDEX: 26.66 KG/M2 | DIASTOLIC BLOOD PRESSURE: 89 MMHG | WEIGHT: 160 LBS

## 2022-09-05 DIAGNOSIS — S61.217A LACERATION OF LEFT LITTLE FINGER WITHOUT FOREIGN BODY WITHOUT DAMAGE TO NAIL, INITIAL ENCOUNTER: Primary | ICD-10-CM

## 2022-09-05 PROCEDURE — 99282 EMERGENCY DEPT VISIT SF MDM: CPT

## 2022-09-05 RX ORDER — ARIPIPRAZOLE 30 MG/1
TABLET ORAL
COMMUNITY
Start: 2022-05-01

## 2022-09-05 RX ORDER — METHYLPHENIDATE HYDROCHLORIDE 10 MG/1
10 TABLET ORAL DAILY
COMMUNITY

## 2022-09-05 ASSESSMENT — PAIN DESCRIPTION - ORIENTATION: ORIENTATION: LEFT

## 2022-09-05 ASSESSMENT — PAIN DESCRIPTION - LOCATION: LOCATION: FINGER (COMMENT WHICH ONE)

## 2022-09-05 ASSESSMENT — PAIN SCALES - GENERAL: PAINLEVEL_OUTOF10: 7

## 2022-09-06 NOTE — ED PROVIDER NOTES
[address] [facility] ED  Emergency Department  Faculty Attestation     Pt Name: Nan Walton  MRN: [id number]  [place] 1995  Date of evaluation: 9/6/22    I was personally available for consultation in the Emergency Department. Have reviewed everything on the chart that is available and agree with the documentation provided by the [facility] AND [facility], including discussion about the assessment, treatment plan and disposition. Nan Walton is a 27 y.o. adult who presents with Laceration (Pt presents with left pinky fingertip laceration that pt states occurred at work 8/24/22 . Pt states he was working at [employer] and was cutting lettuce when he states he then reached to unclog the lettuce from the slicer and slicer the \"turned on' cutting his finger. Pt states he did go to urgent care today where they told him to use liquid glue. Pt states he attempted to use the glue. )      Vitals:   Vitals:    09/05/22 1945   BP: 125/89   Pulse: 96   Resp: 14   Temp: 99 °F (37.2 °C)   TempSrc: Oral   SpO2: 100%   Weight: 72.6 kg (160 lb)   Height: 5' 5\" (1.651 m)       Impression:   1.  Laceration of left little finger without foreign body without damage to nail, initial encounter          Juan Conte MD  09/06/22 8926

## 2022-09-06 NOTE — DISCHARGE INSTRUCTIONS
PLEASE RETURN TO THE EMERGENCY DEPARTMENT IMMEDIATELY if your symptoms worsen in anyway. You should immediately return to the ER for symptoms such as increasing pain, fever, drainage from the wound, warmth or redness around the cut, increasing swelling, numbness or weakness to the extremity, color change or coldness to the extremity    Gently clean around the wound with only soap and water, pat dry, keep covered + antibiotic ointment, vaseline, or aquaphor. Don't use hydrogen peroxide or alcohol as this can slow the healing. You can take Tylenol and ibuprofen over the counter for your pain. Follow-up with [facility]. Castshirazlouise Lexi!!!    From [facility] ([facility]) and [address] [place] [address] Emergency Services    On behalf of the Emergency Department staff at [facility]), I would like to thank you for giving us the opportunity to address your health care needs and concerns. We hope that during your visit, our service was delivered in a professional and caring manner. Please keep [facility] ([facility]) in mind as we walk with you down the path to your own personal wellness. Please expect an automated text message or email from us so we can ask a few questions about your health and progress. Based on your answers, a clinician may call you back to offer help and instructions. Please understand that early in the process of an illness or injury, an emergency department workup can be falsely reassuring. If you notice any worsening, changing or persistent symptoms please call your family doctor or return to the ER immediately. Tell us how we did during your visit at [email or web address]   and let us know about your experience

## 2022-09-06 NOTE — ED PROVIDER NOTES
[address] [facility] ED  [address] [address] [facility] [address] [facility] [address]  Phone: [phone]  Fax: [phone]        Pt Name: Nan Walton  MRN: [id number]  [place] 1995  Date of evaluation: 9/6/22    [address]       Chief Complaint   Patient presents with    Laceration     Pt presents with left pinky fingertip laceration that pt states occurred at work 8/24/22 . Pt states he was working at [employer] and was cutting lettuce when he states he then reached to unclog the lettuce from the slicer and slicer the \"turned on' cutting his finger. Pt states he did go to urgent care today where they told him to use liquid glue. Pt states he attempted to use the glue. HISTORY OF PRESENT ILLNESS (Location/Symptom, Timing/Onset, Context/Setting, Quality, Duration, Modifying Factors, Severity)      Nan Walton is a 27 y.o. adult with no pertinent PMH who presents to the ED via private auto with a laceration. Patient states that at work on 8/24 he was cutting lettuce and reach in to unclog the lettuce from the slicer and accidentally sliced his left hand tip of the little finger. He was at work today and it was bothering him so they told him to get checked out. He went to an urgent care and they advised liquid glue but it stung and he couldn't get it to work so he came here. They have exacerbation of the pain with palpation and movement. Denies taking any medication for the pain. Denies any other alleviating or exacerbating factors. Denies use of bloodthinners. The patient is UTD on their tetanus immunization. Denies any fever, chills, redness, discharge, or any other concerns at this time. PAST MEDICAL / SURGICAL / SOCIAL / FAMILY HISTORY     PMH:  has a past medical history of Bipolar disorder without psychotic features ([address] Depression, and Headache. Surgical History:  has a past surgical history that includes Colonoscopy (N/A, 11/4/2019). Social History:  reports that he has never smoked.  He has never used smokeless tobacco. He reports current alcohol use. He reports current drug use. Frequency: 1.00 time per week. Drug: Marijuana Sreedhar Jock). Family History: He indicated that his mother is alive. He indicated that his father is alive. He indicated that his maternal grandmother is alive. He indicated that his maternal grandfather is alive. He indicated that his paternal grandmother is alive. He indicated that his paternal grandfather is alive. family history includes Heart Disease in his paternal grandfather; No Known Problems in his mother. Psychiatric History: None    Allergies: Pcn [penicillins], Beef-derived products, and Pork-derived products    Home Medications:   Prior to Admission medications    Medication Sig Start Date End Date Taking? Authorizing Provider   ARIPiprazole (ABILIFY) 30 MG tablet  5/1/22  Yes Historical Provider, MD   methylphenidate (RITALIN) 10 MG tablet Take 10 mg by mouth Daily. Yes Historical Provider, MD   lamoTRIgine (LAMICTAL) 100 MG tablet     Historical Provider, MD   LORazepam (ATIVAN)  7/1/22   Historical Provider, MD   modafinil (PROVIGIL) 200 MG tablet  8/1/22   Historical Provider, MD   risperiDONE (RISPERDAL) 0.5 MG tablet  7/1/22   Historical Provider, MD   buPROPion (WELLBUTRIN XL) 300 MG extended release tablet Take 300 mg by mouth every morning    Historical Provider, MD   testosterone cypionate (DEPOTESTOTERONE CYPIONATE) 200 MG/ML injection Inject 60 mg into the muscle once a week. 0.3 mL = 60 mg  Patient not taking: Reported on 9/5/2022 9/19/19   Historical Provider, MD       REVIEW OF SYSTEMS  (2-9 systems for level 4, 10 or more for level 5)      Review of Systems    Constitutional: See HPI. Musculoskeletal: See HPI. Skin: See HPI. Neurologic:  Denies headache. Heme: Denies bleeding disorders. All other systems negative except as marked.      PHYSICAL EXAM  (up to 7 for level 4, 8 or more for level 5)      INITIAL VITALS:  height is 5' 5\" (1.651 m) and weight is 72.6 kg (160 lb). His oral temperature is 99 °F (37.2 °C). His blood pressure is 125/89 and his pulse is 96. His respiration is 14 and oxygen saturation is 100%. Vital signs reviewed. Physical Exam    General:  Alert, cooperative, well-groomed, well-nourished, appears stated age, and is in no acute distress. Head:  Normocephalic, atraumatic, and without obvious abnormality. Lungs:   No respiratory distress. CTA bilaterally. No wheezes, rhonchi, or rales. Heart:  Regular rate. Regular rhythm. No murmurs, rubs, or gallops. Hand/Forearm: There is a 0.5cm superficial flap-like laceration over the lateral aspect of the fingertip pad of the left little finger. No active bleeding. No erythema or swelling. Intact sensation to light touch. Intact motor function. Strength 5/5. Full ROM without difficulty, catching, or locking. Circulation intact. Radial pulses 2+. Cap refill less than 2 seconds. Extremities: Warm and dry without erythema or edema. Skin: Soft, good turgor, and well-hydrated. No rashes to the exposed skin. Neurologic: GCS is 15 and no focal deficits are appreciated. Normal gait. Grossly normal motor and sensation. Speech clear. Psychiatric: Normal mood and affect. Normal behavior. Coherent thought process. DIFFERENTIAL DIAGNOSIS / MDM     Patient presented to the ED with a laceration to the left little finger. Vital signs are unremarkable. FROM of all surrounding joints and neurosensory/circulatory exams are appropriate distal to the wound, therefore, it is unlikely that the patient has any tendon, muscle, or nerve involvement. XR was not performed as the object was clean/in one piece and foreign body or fracture is unlikely based on the mechanism. Mechanism was not concerning for significant contamination and recommend no antibiotics at this time, but was advised to perform daily wound checks for signs of infection.  Wound was not closed as it is >24 hours and at this point it will just take longer to heal but will not be sutured. Advised vaseline and a bandage or steri-strip to help with healing. UTD on shots. The patient and/or family and I have discussed the diagnosis and risks, and we agree with discharging home to follow-up with their PCP and/or pertinent providers. The patient appears stable for discharge and has been instructed to return immediately for worsening symptoms or new concerning symptoms including fever, increased pain, weakness, numbness, any color change, coldness to an extremity, etc. The patient understands that at this time there is no evidence for a more malignant underlying process, but the patient also understands that early in the process of an illness or injury, an emergency department workup can be falsely reassuring. Routine discharge counseling was given, and the patient understands that worsening, changing or persistent symptoms should prompt an immediate call or follow up with their primary physician or return to the emergency department. I have reviewed the disposition diagnosis with the patient and or their family/guardian. I have answered their questions and given discharge instructions. They voiced understanding of these instructions and did not have any further questions or complaints. The collaborating physician was available for consultation and they were apprised of the assessment and plan. PLAN (LABS / IMAGING / EKG):  No orders of the defined types were placed in this encounter. MEDICATIONS ORDERED:  No orders of the defined types were placed in this encounter. Controlled Substances Monitoring:     DIAGNOSTIC RESULTS     LABS:  No results found for this visit on 09/05/22.     EMERGENCY DEPARTMENT COURSE           Vitals:    Vitals:    09/05/22 1945   BP: 125/89   Pulse: 96   Resp: 14   Temp: 99 °F (37.2 °C)   TempSrc: Oral   SpO2: 100%   Weight: 72.6 kg (160 lb)   Height: 5' 5\" (1.651 m) -------------------------  BP: 125/89, Temp: 99 °F (37.2 °C), Heart Rate: 96, Resp: 14      RE-EVALUATION:  No re-evaluation was necessary as patient was discharged home after first impression. CONSULTS:  None    PROCEDURES:  None    Jose Maharaj PA-C  Emergency Medicine Physician Assistant    FINAL IMPRESSION      1. Laceration of left little finger without foreign body without damage to nail, initial encounter          DISPOSITION / PLAN     CONDITION ON DISPOSITION:   Good / Stable for discharge.     PATIENT REFERRED TO:  [address]  [phone]  Call in 1 day  For follow-up      DISCHARGE MEDICATIONS:  Discharge Medication List as of 9/5/2022  8:49 PM          Colton Liu   Emergency Medicine Physician Assistant    (Please note that portions of this note were completed with a voice recognition program.  Efforts were made to edit the dictations but occasionally words aremis-transcribed.)        Jose Maharaj PA-C  09/10/22 1419

## 2022-09-16 ENCOUNTER — HOSPITAL ENCOUNTER (OUTPATIENT)
Age: 27
Setting detail: SPECIMEN
Discharge: HOME | End: 2022-09-16

## 2022-09-16 DIAGNOSIS — E66.3 OVERWEIGHT: ICD-10-CM

## 2022-09-16 DIAGNOSIS — R53.83 FATIGUE, UNSPECIFIED TYPE: ICD-10-CM

## 2022-09-16 DIAGNOSIS — L90.6 STRETCH MARKS: ICD-10-CM

## 2022-09-16 DIAGNOSIS — Z11.59 ENCOUNTER FOR HCV SCREENING TEST FOR LOW RISK PATIENT: ICD-10-CM

## 2022-09-16 LAB
ABSOLUTE EOS #: 0.07 K/UL (ref 0–0.44)
ABSOLUTE IMMATURE GRANULOCYTE: <0.03 K/UL (ref 0–0.3)
ABSOLUTE LYMPH #: 1.56 K/UL (ref 1.1–3.7)
ABSOLUTE MONO #: 0.33 K/UL (ref 0.1–1.2)
ALBUMIN SERPL-MCNC: 4.6 G/DL (ref 3.5–5.2)
ALBUMIN/GLOBULIN RATIO: 1.6 (ref 1–2.5)
ALP BLD-CCNC: 44 U/L (ref 35–104)
ALT SERPL-CCNC: 17 U/L (ref 5–33)
ANION GAP SERPL CALCULATED.3IONS-SCNC: 11 MMOL/L (ref 9–17)
AST SERPL-CCNC: 18 U/L
BASOPHILS # BLD: 1 % (ref 0–2)
BASOPHILS ABSOLUTE: 0.03 K/UL (ref 0–0.2)
BILIRUB SERPL-MCNC: 0.2 MG/DL (ref 0.3–1.2)
BUN BLDV-MCNC: 10 MG/DL (ref 6–20)
CALCIUM SERPL-MCNC: 9.9 MG/DL (ref 8.6–10.4)
CHLORIDE BLD-SCNC: 102 MMOL/L (ref 98–107)
CO2: 28 MMOL/L (ref 20–31)
CREAT SERPL-MCNC: 1 MG/DL (ref 0.5–0.9)
EOSINOPHILS RELATIVE PERCENT: 2 % (ref 1–4)
FOLATE: 14.9 NG/ML
GFR AFRICAN AMERICAN: >60 ML/MIN
GFR NON-AFRICAN AMERICAN: >60 ML/MIN
GFR SERPL CREATININE-BSD FRML MDRD: ABNORMAL ML/MIN/{1.73_M2}
GLUCOSE BLD-MCNC: 86 MG/DL (ref 70–99)
HCT VFR BLD CALC: 44.3 % (ref 36.3–47.1)
HEMOGLOBIN: 14.3 G/DL (ref 11.9–15.1)
HEPATITIS C ANTIBODY: NONREACTIVE
IMMATURE GRANULOCYTES: 0 %
LYMPHOCYTES # BLD: 37 % (ref 24–43)
MCH RBC QN AUTO: 31.4 PG (ref 25.2–33.5)
MCHC RBC AUTO-ENTMCNC: 32.3 G/DL (ref 28.4–34.8)
MCV RBC AUTO: 97.4 FL (ref 82.6–102.9)
MONOCYTES # BLD: 8 % (ref 3–12)
NRBC AUTOMATED: 0 PER 100 WBC
PDW BLD-RTO: 13 % (ref 11.8–14.4)
PLATELET # BLD: 232 K/UL (ref 138–453)
PMV BLD AUTO: 11.3 FL (ref 8.1–13.5)
POTASSIUM SERPL-SCNC: 4.1 MMOL/L (ref 3.7–5.3)
RBC # BLD: 4.55 M/UL (ref 3.95–5.11)
SEG NEUTROPHILS: 52 % (ref 36–65)
SEGMENTED NEUTROPHILS ABSOLUTE COUNT: 2.25 K/UL (ref 1.5–8.1)
SODIUM BLD-SCNC: 141 MMOL/L (ref 135–144)
TOTAL PROTEIN: 7.4 G/DL (ref 6.4–8.3)
TSH SERPL DL<=0.05 MIU/L-ACNC: 1.34 UIU/ML (ref 0.3–5)
VITAMIN B-12: 1143 PG/ML (ref 232–1245)
VITAMIN D 25-HYDROXY: 28 NG/ML
WBC # BLD: 4.3 K/UL (ref 3.5–11.3)

## 2022-11-28 ENCOUNTER — OFFICE VISIT (OUTPATIENT)
Dept: FAMILY MEDICINE CLINIC | Age: 27
End: 2022-11-28
Payer: COMMERCIAL

## 2022-11-28 ENCOUNTER — HOSPITAL ENCOUNTER (OUTPATIENT)
Dept: GENERAL RADIOLOGY | Age: 27
Discharge: HOME | End: 2022-11-30
Payer: COMMERCIAL

## 2022-11-28 ENCOUNTER — HOSPITAL ENCOUNTER (OUTPATIENT)
Age: 27
Discharge: HOME | End: 2022-11-30
Payer: COMMERCIAL

## 2022-11-28 VITALS
OXYGEN SATURATION: 98 % | WEIGHT: 163 LBS | BODY MASS INDEX: 27.16 KG/M2 | DIASTOLIC BLOOD PRESSURE: 60 MMHG | HEART RATE: 96 BPM | RESPIRATION RATE: 14 BRPM | SYSTOLIC BLOOD PRESSURE: 93 MMHG | TEMPERATURE: 98.1 F | HEIGHT: 65 IN

## 2022-11-28 DIAGNOSIS — J06.9 ACUTE URI: Primary | ICD-10-CM

## 2022-11-28 DIAGNOSIS — R05.1 ACUTE COUGH: ICD-10-CM

## 2022-11-28 PROCEDURE — 99213 OFFICE O/P EST LOW 20 MIN: CPT | Performed by: REGISTERED NURSE

## 2022-11-28 PROCEDURE — 71046 X-RAY EXAM CHEST 2 VIEWS: CPT

## 2022-11-28 RX ORDER — ONDANSETRON 4 MG/1
4 TABLET, FILM COATED ORAL EVERY 8 HOURS PRN
Qty: 15 TABLET | Refills: 0 | Status: SHIPPED | OUTPATIENT
Start: 2022-11-28 | End: 2022-12-03

## 2022-11-28 RX ORDER — AZITHROMYCIN 250 MG/1
TABLET, FILM COATED ORAL
Qty: 6 TABLET | Refills: 0 | Status: SHIPPED | OUTPATIENT
Start: 2022-11-28

## 2022-11-28 ASSESSMENT — ENCOUNTER SYMPTOMS
ABDOMINAL PAIN: 0
SHORTNESS OF BREATH: 0
BACK PAIN: 1
DIARRHEA: 0
NAUSEA: 0
WHEEZING: 0
BOWEL INCONTINENCE: 0
CHEST TIGHTNESS: 1
COUGH: 1
CONSTIPATION: 0
EYES NEGATIVE: 1
VOMITING: 0

## 2022-11-28 NOTE — RESULT ENCOUNTER NOTE
May message patient chest xray was normal, please take the zpack. May try the zofran and take with the tessalon perles for cough to limit nausea.

## 2022-11-28 NOTE — PROGRESS NOTES
[address] [facility]  [address] [place] [address]  Dept: [phone]  Dept Fax: [phone]    Chelle Boyer is a 24 y.o. adult who presents today for his medical conditions/complaints of   Chief Complaint   Patient presents with    Back Pain     For last 1.5 months , hurts all over the whole back per pt    Cough     For ~ 3-3.5 weeks, coughs so hard patient gags and almost vomits but non-productive          HPI:     BP 93/60   Pulse 96   Temp 98.1 °F (36.7 °C) (Temporal)   Resp 14   Ht 5' 5\" (1.651 m)   Wt 163 lb (73.9 kg)   SpO2 98%   BMI 27.12 kg/m²     Denies any known trauma to the back. Was seen at an outside urgent care, tried muscle relaxer for about one week for the back pain, states this was not helpful. Pain in the back began around 3 weeks ago, along with the cough. Also took oral steroids for symptoms. Has not had any chest x-rays. No known injury to the back reported. Tesslon perles caused nausea, did not continue to take this due to the nausea. OTC cough medication reported to not be helpful. Back Pain  This is a new problem. Episode onset: x 3 weeks. The problem occurs intermittently. The pain is present in the thoracic spine. Quality: Throbbing and sharp. The pain does not radiate. The pain is at a severity of 9/10. The symptoms are aggravated by coughing. Associated symptoms include headaches. Pertinent negatives include no abdominal pain, bladder incontinence, bowel incontinence, chest pain, dysuria, fever, numbness, paresthesias, perianal numbness, tingling or weakness. He has tried NSAIDs, analgesics and muscle relaxant (Muscle relaxers) for the symptoms. The treatment provided no relief. Cough  This is a new problem. Episode onset: x 3 weeks. The problem has been waxing and waning. The cough is Non-productive. Associated symptoms include headaches.  Pertinent negatives include no chest pain, chills, fever, shortness of breath or wheezing. Associated symptoms comments: +chest congestion . He has tried OTC cough suppressant for the symptoms. The treatment provided no relief. There is no history of asthma, bronchitis, COPD or pneumonia. Past Medical History:   Diagnosis Date    Bipolar disorder without psychotic features ([facility] [address]     Depression     Headache         Past Surgical History:   Procedure Laterality Date    COLONOSCOPY N/A 11/4/2019    COLONOSCOPY WITH BIOPSY performed by Enrrique Alonzo MD at [facility]       Family History   Problem Relation Age of Onset    No Known Problems Mother     Heart Disease Paternal Grandfather        Social History     Tobacco Use    Smoking status: Never    Smokeless tobacco: Never   Substance Use Topics    Alcohol use: Yes     Comment: socially         Prior to Visit Medications    Medication Sig Taking? Authorizing Provider   azithromycin (ZITHROMAX) 250 MG tablet 500mg on day 1 followed by 250mg on days 2 - 5 Yes WALI Del Real - CNP   ondansetron (ZOFRAN) 4 MG tablet Take 1 tablet by mouth every 8 hours as needed for Nausea or Vomiting (1 TABS EVERY 8 HOURS PRN) Yes WALI Del Real - CNP   ARIPiprazole (ABILIFY) 30 MG tablet   Historical Provider, MD   methylphenidate (RITALIN) 10 MG tablet Take 10 mg by mouth Daily. Historical Provider, MD   lamoTRIgine (LAMICTAL) 100 MG tablet 150 mg 2 times daily  Historical Provider, MD   LORazepam (ATIVAN)   Historical Provider, MD   modafinil (PROVIGIL) 200 MG tablet   Historical Provider, MD   risperiDONE (RISPERDAL) 0.5 MG tablet   Historical Provider, MD   buPROPion (WELLBUTRIN XL) 300 MG extended release tablet Take 300 mg by mouth every morning  Historical Provider, MD   testosterone cypionate (DEPOTESTOTERONE CYPIONATE) 200 MG/ML injection Inject 60 mg into the muscle once a week.  0.3 mL = 60 mg  Historical Provider, MD       Allergies   Allergen Reactions    Pcn [Penicillins] Anaphylaxis and Hives    Beef-Derived Products     Pork-Derived Products          Subjective:      Review of Systems   Constitutional:  Negative for chills, fatigue and fever. HENT: Negative. Eyes: Negative. Respiratory:  Positive for cough and chest tightness. Negative for shortness of breath and wheezing. Cardiovascular:  Negative for chest pain and palpitations. Gastrointestinal:  Negative for abdominal pain, bowel incontinence, constipation, diarrhea, nausea and vomiting. Genitourinary:  Negative for bladder incontinence and dysuria. Musculoskeletal:  Positive for back pain. Neurological:  Positive for headaches. Negative for tingling, weakness, numbness and paresthesias. Psychiatric/Behavioral: Negative. Objective:     Physical Exam  Constitutional:       General: He is not in acute distress. Appearance: Normal appearance. He is normal weight. He is not ill-appearing, toxic-appearing or diaphoretic. HENT:      Mouth/Throat:      Mouth: Mucous membranes are moist.      Pharynx: Oropharynx is clear. Eyes:      Conjunctiva/sclera: Conjunctivae normal.   Cardiovascular:      Rate and Rhythm: Normal rate and regular rhythm. Heart sounds: Normal heart sounds. Pulmonary:      Effort: Pulmonary effort is normal. No respiratory distress. Breath sounds: Normal breath sounds. No stridor. No wheezing, rhonchi or rales. Musculoskeletal:      Cervical back: Normal.      Thoracic back: Tenderness present. No swelling, edema, deformity, signs of trauma, lacerations or spasms. Lumbar back: No swelling or tenderness. Back:    Skin:     General: Skin is warm. Coloration: Skin is not pale. Findings: No rash. Neurological:      General: No focal deficit present. Mental Status: He is alert and oriented to person, place, and time.    Psychiatric:         Mood and Affect: Mood normal.         MEDICAL DECISION MAKING Assessment/Plan:     Mancel Button was seen today for back pain and cough. Diagnoses and all orders for this visit:    Acute URI  -     azithromycin (ZITHROMAX) 250 MG tablet; 500mg on day 1 followed by 250mg on days 2 - 5    Acute cough  -     XR CHEST (2 VW); Future    Other orders  -     ondansetron (ZOFRAN) 4 MG tablet; Take 1 tablet by mouth every 8 hours as needed for Nausea or Vomiting (1 TABS EVERY 8 HOURS PRN)    Chest xray today shows: FINDINGS:   The lungs are without acute focal process. There is no effusion or   pneumothorax. The cardiomediastinal silhouette is without acute process. The   osseous structures are without acute process. Will do zpack for bronchitis URI symptoms. Advised to try the zofran with tessalon perles for ongoing cough to limit nausea. OTC pain medication for back pain. Heat advised for back pain. No recent trauma to indicate reason for spinal imaging. Pt to fill and take medications as prescribed. Rest, increase fluids. Return if no improvement in symptoms, should follow up with PCP for ongoing symptoms. Go to the ER for any emergent concern such as shortness of breath and or chest pain.         Results for orders placed or performed during the hospital encounter of 09/16/22   Comprehensive Metabolic Panel   Result Value Ref Range    Glucose 86 70 - 99 mg/dL    BUN 10 6 - 20 mg/dL    Creatinine 1.00 (H) 0.50 - 0.90 mg/dL    Calcium 9.9 8.6 - 10.4 mg/dL    Sodium 141 135 - 144 mmol/L    Potassium 4.1 3.7 - 5.3 mmol/L    Chloride 102 98 - 107 mmol/L    CO2 28 20 - 31 mmol/L    Anion Gap 11 9 - 17 mmol/L    Alkaline Phosphatase 44 35 - 104 U/L    ALT 17 5 - 33 U/L    AST 18 <32 U/L    Total Bilirubin 0.2 (L) 0.3 - 1.2 mg/dL    Total Protein 7.4 6.4 - 8.3 g/dL    Albumin 4.6 3.5 - 5.2 g/dL    Albumin/Globulin Ratio 1.6 1.0 - 2.5    GFR Non-African American >60 >60 mL/min    GFR African American >60 >60 mL/min    GFR Comment         CBC with Auto Differential   Result Value Ref Range    WBC 4.3 3.5 - 11.3 k/uL    RBC 4.55 3.95 - 5.11 m/uL    Hemoglobin 14.3 11.9 - 15.1 g/dL    Hematocrit 44.3 36.3 - 47.1 %    MCV 97.4 82.6 - 102.9 fL    MCH 31.4 25.2 - 33.5 pg    MCHC 32.3 28.4 - 34.8 g/dL    RDW 13.0 11.8 - 14.4 %    Platelets 517 268 - 758 k/uL    MPV 11.3 8.1 - 13.5 fL    NRBC Automated 0.0 0.0 per 100 WBC    Seg Neutrophils 52 36 - 65 %    Lymphocytes 37 24 - 43 %    Monocytes 8 3 - 12 %    Eosinophils % 2 1 - 4 %    Basophils 1 0 - 2 %    Immature Granulocytes 0 0 %    Segs Absolute 2.25 1.50 - 8.10 k/uL    Absolute Lymph # 1.56 1.10 - 3.70 k/uL    Absolute Mono # 0.33 0.10 - 1.20 k/uL    Absolute Eos # 0.07 0.00 - 0.44 k/uL    Basophils Absolute 0.03 0.00 - 0.20 k/uL    Absolute Immature Granulocyte <0.03 0.00 - 0.30 k/uL   Hepatitis C Antibody   Result Value Ref Range    Hepatitis C Ab NONREACTIVE NONREACTIVE   Vitamin D 25 Hydroxy   Result Value Ref Range    Vit D, 25-Hydroxy 28.0 (L) >29.9 ng/mL   Vitamin B12 & Folate   Result Value Ref Range    Vitamin B-12 1143 232 - 1245 pg/mL    Folate 14.9 >4.8 ng/mL   TSH with Reflex   Result Value Ref Range    TSH 1.34 0.30 - 5.00 uIU/mL       Patient counseled:     Patient given educational materials - see patientinstructions. Discussed use, benefit, and side effects of prescribed medications. All patient questions answered. Pt verbalized understanding. Instructed to continue current medications, diet and exercise. Patient agreed with treatment plan. Follow up as directed.      Electronically signed by WALI Chow CNP on 11/28/2022 at 6:19 PM

## 2022-11-29 ENCOUNTER — NURSE TRIAGE (OUTPATIENT)
Dept: OTHER | Facility: CLINIC | Age: 27
End: 2022-11-29

## 2022-11-29 ENCOUNTER — TELEPHONE (OUTPATIENT)
Dept: FAMILY MEDICINE CLINIC | Age: 27
End: 2022-11-29

## 2022-11-29 NOTE — TELEPHONE ENCOUNTER
Patient called back. Cough, chest discomfort, head pressure. Patient states covid test was negative have not been around anyone with covid/flu. Patient xray was negative in chart. Patient was informed to wear a mask when coming to appt. Voiced understanding.

## 2022-11-29 NOTE — TELEPHONE ENCOUNTER
Location of patient: [place]    Received call from [facility] at [facility] ([facility] with [employer]. Subjective: Caller states \"I woke up a month and a half ago with back pain that was severe. It went away or I just got use to it. I developed a cough about 3 weeks ago. I got an xray done at walk in clinic yesterday and they told me to follow up with my PCP\"     Current Symptoms: cough- worse when talking or laying down, fatigue, pressure in head    Onset: 4 week ago; intermittent       No triage needed.

## 2022-11-29 NOTE — TELEPHONE ENCOUNTER
Called and left message for pt to call office. Ecc scheduled patient for cough. Nurse triage note states patient has had a cough for 3 weeks with headache and back pain. Patient had xray completed that was negative in chart. Patient was screened green by triage. 2nd screen to be completed. If patient is positive and screened red.  May need to follow up by virtual.

## 2022-11-30 ENCOUNTER — HOSPITAL ENCOUNTER (OUTPATIENT)
Dept: GENERAL RADIOLOGY | Age: 27
Discharge: HOME | End: 2022-12-02
Payer: COMMERCIAL

## 2022-11-30 ENCOUNTER — HOSPITAL ENCOUNTER (OUTPATIENT)
Age: 27
Discharge: HOME | End: 2022-11-30
Payer: COMMERCIAL

## 2022-11-30 ENCOUNTER — HOSPITAL ENCOUNTER (OUTPATIENT)
Age: 27
Discharge: HOME | End: 2022-12-02
Payer: COMMERCIAL

## 2022-11-30 DIAGNOSIS — M54.6 CHRONIC BILATERAL THORACIC BACK PAIN: ICD-10-CM

## 2022-11-30 DIAGNOSIS — G89.29 CHRONIC BILATERAL THORACIC BACK PAIN: ICD-10-CM

## 2022-11-30 DIAGNOSIS — R07.89 ATYPICAL CHEST PAIN: ICD-10-CM

## 2022-11-30 PROCEDURE — 93005 ELECTROCARDIOGRAM TRACING: CPT

## 2022-11-30 PROCEDURE — 72072 X-RAY EXAM THORAC SPINE 3VWS: CPT

## 2022-12-02 ENCOUNTER — OFFICE VISIT (OUTPATIENT)
Dept: FAMILY MEDICINE CLINIC | Age: 27
End: 2022-12-02
Payer: COMMERCIAL

## 2022-12-02 VITALS
TEMPERATURE: 98.2 F | WEIGHT: 165 LBS | HEART RATE: 100 BPM | HEIGHT: 65 IN | SYSTOLIC BLOOD PRESSURE: 90 MMHG | DIASTOLIC BLOOD PRESSURE: 54 MMHG | BODY MASS INDEX: 27.49 KG/M2 | OXYGEN SATURATION: 97 %

## 2022-12-02 DIAGNOSIS — G89.29 CHRONIC BILATERAL THORACIC BACK PAIN: Primary | ICD-10-CM

## 2022-12-02 DIAGNOSIS — M54.6 CHRONIC BILATERAL THORACIC BACK PAIN: Primary | ICD-10-CM

## 2022-12-02 LAB
EKG ATRIAL RATE: 99 BPM
EKG P AXIS: 60 DEGREES
EKG P-R INTERVAL: 130 MS
EKG Q-T INTERVAL: 358 MS
EKG QRS DURATION: 88 MS
EKG QTC CALCULATION (BAZETT): 459 MS
EKG R AXIS: 84 DEGREES
EKG T AXIS: 58 DEGREES
EKG VENTRICULAR RATE: 99 BPM

## 2022-12-02 PROCEDURE — 96372 THER/PROPH/DIAG INJ SC/IM: CPT | Performed by: NURSE PRACTITIONER

## 2022-12-02 PROCEDURE — 99214 OFFICE O/P EST MOD 30 MIN: CPT | Performed by: NURSE PRACTITIONER

## 2022-12-02 RX ORDER — KETOROLAC TROMETHAMINE 30 MG/ML
30 INJECTION, SOLUTION INTRAMUSCULAR; INTRAVENOUS ONCE
Status: COMPLETED | OUTPATIENT
Start: 2022-12-02 | End: 2022-12-02

## 2022-12-02 RX ADMIN — KETOROLAC TROMETHAMINE 30 MG: 30 INJECTION, SOLUTION INTRAMUSCULAR; INTRAVENOUS at 15:22

## 2022-12-02 ASSESSMENT — ENCOUNTER SYMPTOMS
SHORTNESS OF BREATH: 0
VOMITING: 0
SORE THROAT: 0
RHINORRHEA: 0
NAUSEA: 0
EYE PAIN: 0
BACK PAIN: 1

## 2022-12-02 NOTE — PROGRESS NOTES
[address]  [phone] [address]  Dept: [phone]  Dept Fax: [phone]    Florentino Velazquez is a 24 y.o. adult who presents today for his medical conditions/complaints of   Chief Complaint   Patient presents with    Back Pain     Saw PCP had x-rays told neg, has appt with PCP in 3-4 weeks does not want to wait to see what to do          HPI:     BP (!) 90/54   Pulse 100   Temp 98.2 °F (36.8 °C) (Temporal)   Ht 5' 5\" (1.651 m)   Wt 165 lb (74.8 kg)   SpO2 97%   BMI 27.46 kg/m²       HPI  Patient complains of  greater then 4 week(s) history of bilateral middle back pain. Pain is burning and throbbing in nature, without radiation. Pain is persistent, typically severe in intensity, and is exacerbated by nothing in particular. Pt states she woke up one day with the pain. Associated symptoms: none. Pain does wake her from sleep and pt had to leave work early today due to pain. Rates pain now 10.10. Precipitating factors: no known injury. Went to UC last month and was given oral prednisone, muscle relaxer which did not help. Has been using heat/ice and taking Motrin with no relief. Was seen by her PCP and x-rays ordered which were neg. She does have f/u with PCP end of the month. No previous history of back issues.      Past Medical History:   Diagnosis Date    Bipolar disorder without psychotic features ([address]     Depression     Headache         Past Surgical History:   Procedure Laterality Date    COLONOSCOPY N/A 11/4/2019    COLONOSCOPY WITH BIOPSY performed by Satnam Milan MD at [facility]       Family History   Problem Relation Age of Onset    No Known Problems Mother     Heart Disease Paternal Grandfather        Social History     Tobacco Use    Smoking status: Never    Smokeless tobacco: Never   Substance Use Topics    Alcohol use: Yes     Comment: socially         Prior to Visit Medications    Medication Sig Taking? Authorizing Provider   doxycycline hyclate (VIBRA-TABS) 100 MG tablet Take 1 tablet by mouth 2 times daily for 5 days  Harris Rai MD   azithromycin (ZITHROMAX) 250 MG tablet 500mg on day 1 followed by 250mg on days 2 - 5  WALI Del Real - CNP   ondansetron (ZOFRAN) 4 MG tablet Take 1 tablet by mouth every 8 hours as needed for Nausea or Vomiting (1 TABS EVERY 8 HOURS PRN)  WALI Del Real - CNP   ARIPiprazole (ABILIFY) 30 MG tablet   Historical Provider, MD   LORazepam (ATIVAN)   Historical Provider, MD   risperiDONE (RISPERDAL) 0.5 MG tablet   Historical Provider, MD   buPROPion (WELLBUTRIN XL) 300 MG extended release tablet Take 300 mg by mouth every morning  Historical Provider, MD   testosterone cypionate (DEPOTESTOTERONE CYPIONATE) 200 MG/ML injection Inject 60 mg into the muscle once a week. 0.3 mL = 60 mg  Historical Provider, MD       Allergies   Allergen Reactions    Pcn [Penicillins] Anaphylaxis and Hives    Beef-Derived Products     Pork-Derived Products          Subjective:      Review of Systems   Constitutional:  Negative for chills and fever. HENT:  Negative for congestion, ear pain, rhinorrhea and sore throat. Eyes:  Negative for pain and visual disturbance. Respiratory:  Negative for shortness of breath. Cardiovascular:  Negative for chest pain, palpitations and leg swelling. Gastrointestinal:  Negative for nausea and vomiting. Genitourinary:  Negative for decreased urine volume and difficulty urinating. Musculoskeletal:  Positive for back pain. Negative for gait problem, myalgias, neck pain and neck stiffness. Skin:  Negative for pallor and rash. Neurological:  Negative for weakness, light-headedness and headaches. Psychiatric/Behavioral:  Negative for sleep disturbance. Objective:     Physical Exam  Vitals and nursing note reviewed. Constitutional:       General: He is not in acute distress.      Appearance: Normal appearance. HENT:      Head: Normocephalic and atraumatic. Right Ear: Tympanic membrane and ear canal normal.      Left Ear: Tympanic membrane and ear canal normal.      Nose: Nose normal.      Mouth/Throat:      Lips: Pink. Mouth: Mucous membranes are moist.      Pharynx: Oropharynx is clear. Uvula midline. Eyes:      Extraocular Movements: Extraocular movements intact. Conjunctiva/sclera: Conjunctivae normal.   Cardiovascular:      Rate and Rhythm: Normal rate and regular rhythm. Pulses: Normal pulses. Pulmonary:      Effort: Pulmonary effort is normal.      Breath sounds: Normal breath sounds. Abdominal:      General: Bowel sounds are normal.      Palpations: Abdomen is soft. Musculoskeletal:         General: Normal range of motion. Cervical back: Normal range of motion and neck supple. Comments: Thoracic paraspinal muscle tenderness to palpation and spasm. No midline spinal tenderness, step off or deformity. Extremities are non tender to palpation and non erythematous. Skin is warm to the touch. Compartments are soft. No peripheral edema. Calf soft, non-tender, non-erythematous. Moves all extremities x 4 without motor or sensory deficit. Gait is steady without ataxia. Pedal pulses palpable bilaterally. SLR neg. Skin:     General: Skin is warm and dry. Capillary Refill: Capillary refill takes less than 2 seconds. Neurological:      Mental Status: He is alert and oriented to person, place, and time. Psychiatric:         Mood and Affect: Mood normal.         Thought Content: Thought content normal.         MEDICAL DECISION MAKING Assessment/Plan:     Preethi Jauregui was seen today for back pain.     Diagnoses and all orders for this visit:    Chronic bilateral thoracic back pain  -     ketorolac (TORADOL) injection 30 mg    Administrations This Visit       ketorolac (TORADOL) injection 30 mg       Admin Date  12/02/2022  15:22 Action  Given Dose  30 mg Route  IntraMUSCular Site  Ventrogluteal Right Administered By  Grace Sparks MA    Ordering Provider: WALI Torres CNP    NDC: 3412-0942-25    Lot#: [id number]    : [address]    Patient Supplied?: No                  Results for orders placed or performed during the hospital encounter of 11/30/22   EKG 12 lead   Result Value Ref Range    Ventricular Rate 99 BPM    Atrial Rate 99 BPM    P-R Interval 130 ms    QRS Duration 88 ms    Q-T Interval 358 ms    QTc Calculation (Bazett) 459 ms    P Axis 60 degrees    R Axis 84 degrees    T Axis 58 degrees     The patient presents with low back pain without signs of spinal cord compression, cauda equina syndrome, infection, aneurysm or other serious etiology. The patient is neurologically intact. Given the extremely low risk of these diagnoses further testing and evaluation for these possibilities does not appear to be indicated at this time. The patient has been instructed to return if the symptoms worsen or change in any way. Toradol injection administered in the office today. Pt tolerated well. Discussed with patient follow up with PCP to discuss PT or further testing. Pt to return if symptoms are not improving or worsening. Go to the ER for any emergent concern. Patient given educational materials - see patientinstructions. Discussed use, benefit, and side effects of prescribed medications. All patient questions answered. Pt verbalized understanding. Instructed to continue current medications, diet and exercise. Patient agreed with treatment plan. Follow up as directed.      Electronically signed by Gerldine Nageotte, APRN - CNP on 12/2/2022 at 7:38 PM

## 2022-12-14 SDOH — HEALTH STABILITY: PHYSICAL HEALTH: ON AVERAGE, HOW MANY MINUTES DO YOU ENGAGE IN EXERCISE AT THIS LEVEL?: 20 MIN

## 2022-12-14 SDOH — HEALTH STABILITY: PHYSICAL HEALTH: ON AVERAGE, HOW MANY DAYS PER WEEK DO YOU ENGAGE IN MODERATE TO STRENUOUS EXERCISE (LIKE A BRISK WALK)?: 3 DAYS

## 2022-12-14 ASSESSMENT — SOCIAL DETERMINANTS OF HEALTH (SDOH)
WITHIN THE LAST YEAR, HAVE YOU BEEN HUMILIATED OR EMOTIONALLY ABUSED IN OTHER WAYS BY YOUR PARTNER OR EX-PARTNER?: PATIENT DECLINED
WITHIN THE LAST YEAR, HAVE TO BEEN RAPED OR FORCED TO HAVE ANY KIND OF SEXUAL ACTIVITY BY YOUR PARTNER OR EX-PARTNER?: PATIENT DECLINED
WITHIN THE LAST YEAR, HAVE YOU BEEN AFRAID OF YOUR PARTNER OR EX-PARTNER?: PATIENT DECLINED
WITHIN THE LAST YEAR, HAVE YOU BEEN KICKED, HIT, SLAPPED, OR OTHERWISE PHYSICALLY HURT BY YOUR PARTNER OR EX-PARTNER?: PATIENT DECLINED

## 2022-12-15 ENCOUNTER — OFFICE VISIT (OUTPATIENT)
Dept: ORTHOPEDIC SURGERY | Age: 27
End: 2022-12-15
Payer: COMMERCIAL

## 2022-12-15 VITALS — RESPIRATION RATE: 14 BRPM | WEIGHT: 165 LBS | BODY MASS INDEX: 27.49 KG/M2 | HEIGHT: 65 IN

## 2022-12-15 DIAGNOSIS — S29.019A THORACIC MYOFASCIAL STRAIN, INITIAL ENCOUNTER: ICD-10-CM

## 2022-12-15 DIAGNOSIS — M54.6 ACUTE BILATERAL THORACIC BACK PAIN: Primary | ICD-10-CM

## 2022-12-15 PROCEDURE — 99204 OFFICE O/P NEW MOD 45 MIN: CPT | Performed by: PHYSICIAN ASSISTANT

## 2022-12-15 RX ORDER — DICLOFENAC SODIUM 75 MG/1
75 TABLET, DELAYED RELEASE ORAL 2 TIMES DAILY WITH MEALS
Qty: 28 TABLET | Refills: 0 | Status: SHIPPED | OUTPATIENT
Start: 2022-12-15 | End: 2022-12-29

## 2022-12-15 NOTE — PROGRESS NOTES
[facility], [address] [place], [address] [place] [address] Rd, [address] [facility]           Dept Phone: [phone]           Dept Fax:  [phone] [facility], [place]          Dept Phone: [phone]           Dept Fax:  [phone]      Chief Compliant:  Chief Complaint   Patient presents with    Back Problem     Thoracic Back pain        History of Present Illness: This is a 24 y.o. adult who presents to the clinic today for evaluation of 2.5-week history of thoracic and low back pain. Patient reports that he woke up 1 day around that time with pain started out of the blue without any injury or trauma. Patient states he works in [employer] where he does a lot of long-term standing twisting and turning which does seem to aggravate the pain but does not recall anything out of the ordinary the day prior to his onset of pain. Patient reports he was seen by local urgent care as well as primary care provider where x-rays were done on 11/30/2022 and negative for any acute fracture. He has tried oral steroid pack, muscle relaxants as well as a IM Toradol shot with minimal improvement of pain. Patient reports pain is most severe in the midline thoracic area just below the shoulder blade level but he has had some increased soreness in bilateral arms and legs. No numbness or tingling no unilateral weakness no fever chills. No bowel or bladder dysfunction. No history of significant back problems no history of back surgery. .     Past History:    Current Outpatient Medications:     diclofenac (VOLTAREN) 75 MG EC tablet, Take 1 tablet by mouth 2 times daily (with meals) for 14 days, Disp: 28 tablet, Rfl: 0    ARIPiprazole (ABILIFY) 30 MG tablet, , Disp: , Rfl:     LORazepam (ATIVAN), , Disp: , Rfl:     risperiDONE (RISPERDAL) 0.5 MG tablet, , Disp: , Rfl: buPROPion (WELLBUTRIN XL) 300 MG extended release tablet, Take 300 mg by mouth every morning, Disp: , Rfl:   Allergies   Allergen Reactions    Pcn [Penicillins] Anaphylaxis and Hives    Beef-Derived Products     Pork-Derived Products      Social History     Socioeconomic History    Marital status: Single     Spouse name: Not on file    Number of children: Not on file    Years of education: Not on file    Highest education level: Not on file   Occupational History    Not on file   Tobacco Use    Smoking status: Never    Smokeless tobacco: Never   Vaping Use    Vaping Use: Never used   Substance and Sexual Activity    Alcohol use: Yes     Comment: socially     Drug use: Yes     Frequency: 1.0 times per week     Types: Marijuana Deadra Connor)     Comment: weekly    Sexual activity: Not on file   Other Topics Concern    Not on file   Social History Narrative    Not on file     Social Determinants of Health     Financial Resource Strain: Low Risk     Difficulty of Paying Living Expenses: Not hard at all   Food Insecurity: No Food Insecurity    Worried About Running Out of Food in the Last Year: Never true    Ran Out of Food in the Last Year: Never true   Transportation Needs: Not on file   Physical Activity: Insufficiently Active    Days of Exercise per Week: 3 days    Minutes of Exercise per Session: 20 min   Stress: Not on file   Social Connections: Not on file   Intimate Partner Violence: Unknown    Fear of Current or Ex-Partner: Patient refused    Emotionally Abused: Patient refused    Physically Abused: Patient refused    Sexually Abused: Patient refused   Housing Stability: Not on file     Past Medical History:   Diagnosis Date    Bipolar disorder without psychotic features ([facility] [address]     Depression     Headache      Past Surgical History:   Procedure Laterality Date    COLONOSCOPY N/A 11/4/2019    COLONOSCOPY WITH BIOPSY performed by Shakila Ford MD at [address] [facility] [address] History   Problem Relation Age of Onset No Known Problems Mother     Heart Disease Paternal Grandfather         Review of Systems   Constitutional: Negative for fever, chills, sweats. Eyes: Negative for changes in vision, or pain. HENT: Negative for ear ache, epistaxis, or sore throat. Respiratory/Cardio: Negative for Chest pain, palpitations, SOB, or cough. Gastrointestinal: Negative for abdominal pain, N/V/D. Genitourinary: Negative for dysuria, frequency, urgency, or hematuria. Neurological: Negative for headache, numbness, or weakness. Integumentary: Negative for rash, itching, laceration, or abrasion. Musculoskeletal: Positive for Back Problem (Thoracic Back pain)       Physical Exam:  Constitutional: Patient is oriented to person, place, and time. Patient appears well-developed and well nourished. HENT: Negative otherwise noted  Head: Normocephalic and Atraumatic  Nose: Normal  Eyes: Conjunctivae and EOM are normal  Neck: Normal range of motion Neck supple. Respiratory/Cardio: Effort normal. No respiratory distress. Musculoskeletal:    Thoracic and Lumbar EXAMINATION:  Inspection: Local inspection shows no step-off or bruising. Lumbar alignment is normal.  Sagittal and Coronal balance is neutral.      Palpation:   Diffuse tenderness bilaterally at the thoracic paraspinal. No evidence of tenderness at the midline. There is no step-off or paraspinal spasm. Range of Motion: Lumbar flexion, extension and rotation are mildly limited due to pain. Strength:   Strength testing is 5/5 in all muscle groups tested. Special Tests:   Straight leg raise and crossed SLR negative. Leg length and pelvis level.  0 out of 5 Doris's signs. Skin: There are no rashes, ulcerations or lesions. Reflexes: Reflexes are symmetrically 2+ at the patellar and ankle tendons. Clonus absent bilaterally at the feet.   Gait & station: normal, patient ambulates without assistance  Additional Examinations:   RIGHT LOWER EXTREMITY: Inspection/examination of the right lower extremity does not show any tenderness, deformity or injury. Range of motion is unremarkable. There is no gross instability. There are no rashes, ulcerations or lesions. Strength and tone are normal.  LEFT LOWER EXTREMITY:  Inspection/examination of the left lower extremity does not show any tenderness, deformity or injury. Range of motion is unremarkable. There is no gross instability. There are no rashes, ulcerations or lesions. Strength and tone are normal.    Neurological: Patient is alert and oriented to person, place, and time. Normal strenght. No sensory deficit. Skin: Skin is warm and dry  Psychiatric: Behavior is normal. Thought content normal.  Nursing note and vitals reviewed. Labs and Imaging:     XR taken today:  No results found. No new x-rays taken today however those from 11/30/2022 available for independent review. 3 views of the thoracic spine demonstrate normal thoracic kyphosis without evidence of acute fracture, spondylitic instability or other acute osseous abnormality. No aggressive osseous lesions noted. Orders Placed This Encounter   Procedures    [facility]  [place]     Referral Priority:   Routine     Referral Type:   Eval and Treat     Referral Reason:   Specialty Services Required     Requested Specialty:   Physical Therapist     Number of Visits Requested:   1       Assessment and Plan:  1. Acute bilateral thoracic back pain    2. Thoracic myofascial strain, initial encounter          PLAN:  Madalyn Rodriguez is a 24 y.o. old adultwho presents today for evaluation of mid and low back pain. Pain has been present for 2.5 weeks. Differential includes thoracic or lumbar strain, fracture, spondylitic spondylolisthesis, DDD, radiculopathy, epidural abscess, epidural hematoma and cauda equina syndrome. Based on examination there is no evidence of cord compression syndrome or infectious etiology.   Examination is consistent with thoracic paraspinal strain. Patient with some intermittent lumbar pain majority centered over the thoracic area. .    We discussed treatment options available to him, including nonoperative and operative intervention. At this time I believe he will benefit from conservative management. Consequently, a prescription for therapy was provided and a prescription for diclofenac 75 mg twice daily to be taken with meals was electronically sent to his pharmacy. I'll see him back my clinic in 4 weeks for reevaluation but he was encouraged to return or call earlier with questions and/or concerns. Please note that this chart was generated using voice recognition Dragon dictation software. Although every effort was made to ensure the accuracy of this automated transcription, some errors in transcription may have occurred.

## 2022-12-20 ENCOUNTER — PATIENT MESSAGE (OUTPATIENT)
Dept: ORTHOPEDIC SURGERY | Age: 27
End: 2022-12-20

## 2022-12-20 NOTE — TELEPHONE ENCOUNTER
Marito Sosa please see message below,    From: Shaq Lau  To: Tootie Galvan  Sent: 12/20/2022  9:56 AM EST  Subject: Physical therapy     Hello, I was told even with my insurance physical therapy would be 200$ Each visit out of pocket. I was wondering if you can send over some exercises I can do to take place of the physical therapy? ? Also, the anti-inflammatory you prescribed isnt helping as well. Im not sure if its a wait a week or so type of medication for it to work properly. Thanks so much!

## 2022-12-26 NOTE — Clinical Note
Deshawn Ceron was seen and treated in our emergency department on 2/8/2022. She may return to work on 02/09/2022. If you have any questions or concerns, please don't hesitate to call.       Sonny Garland MD Attending note (Marty): patient assessed, reports feeling better, tolerating PO (juice, crackers); on my exam, abdomen is soft nt/nd. Labs reviewed, no acute actionable findings; stable for dc with outpatient pcp f/u, return advised if worsening pain, persistent vomiting.

## 2023-02-23 ENCOUNTER — PATIENT MESSAGE (OUTPATIENT)
Dept: ORTHOPEDIC SURGERY | Age: 28
End: 2023-02-23

## 2023-02-23 DIAGNOSIS — S39.012A STRAIN OF LUMBAR REGION, INITIAL ENCOUNTER: ICD-10-CM

## 2023-02-23 DIAGNOSIS — S29.019A THORACIC MYOFASCIAL STRAIN, INITIAL ENCOUNTER: Primary | ICD-10-CM

## 2023-02-23 DIAGNOSIS — M54.6 ACUTE BILATERAL THORACIC BACK PAIN: ICD-10-CM

## 2023-02-23 NOTE — TELEPHONE ENCOUNTER
Awa,    Please see message below and advise. From: [facility]  To: Sybil Lyman  Sent: 2/23/2023  1:31 PM EST  Subject: MRI/ medication     Hello, Im messaging because my back pain is sadly back and in full force. I was wondering if I can just jump to an MRI being done so we can figure out if there is an issue that can be seen by doing that. None of the medications Ive used in the past have helped at all AND physical therapy is 200$ out of pocket (with insurance) for me each visit and I dont have the funds to do that. Anything you can help with is greatly appreciated! Im not sure what to do anymore about this pain. The pain has been back for over a week now severely. Thanks!

## 2023-03-09 ENCOUNTER — HOSPITAL ENCOUNTER (OUTPATIENT)
Dept: MRI IMAGING | Age: 28
Discharge: HOME | End: 2023-03-11
Payer: COMMERCIAL

## 2023-03-09 DIAGNOSIS — M54.6 ACUTE BILATERAL THORACIC BACK PAIN: ICD-10-CM

## 2023-03-09 DIAGNOSIS — S39.012A STRAIN OF LUMBAR REGION, INITIAL ENCOUNTER: ICD-10-CM

## 2023-03-09 DIAGNOSIS — S29.019A THORACIC MYOFASCIAL STRAIN, INITIAL ENCOUNTER: ICD-10-CM

## 2023-03-09 PROCEDURE — 72148 MRI LUMBAR SPINE W/O DYE: CPT

## 2023-03-09 PROCEDURE — 72146 MRI CHEST SPINE W/O DYE: CPT

## (undated) DEVICE — JELLY,LUBE,STERILE,FLIP TOP,TUBE,2-OZ: Brand: MEDLINE

## (undated) DEVICE — DEFENDO AIR WATER SUCTION AND BIOPSY VALVE KIT FOR  OLYMPUS: Brand: DEFENDO AIR/WATER/SUCTION AND BIOPSY VALVE

## (undated) DEVICE — GLOVE ORANGE PI 7   MSG9070

## (undated) DEVICE — GOWN,POLY REINFORCED,LG: Brand: MEDLINE

## (undated) DEVICE — FORCEPS BX L240CM WRK CHN 2.8MM STD CAP W/ NDL MIC MESH